# Patient Record
Sex: FEMALE | Race: WHITE | NOT HISPANIC OR LATINO | Employment: OTHER | URBAN - NONMETROPOLITAN AREA
[De-identification: names, ages, dates, MRNs, and addresses within clinical notes are randomized per-mention and may not be internally consistent; named-entity substitution may affect disease eponyms.]

---

## 2022-02-26 ENCOUNTER — HOSPITAL ENCOUNTER (EMERGENCY)
Facility: HOSPITAL | Age: 67
Discharge: HOME | End: 2022-02-26
Attending: EMERGENCY MEDICINE | Admitting: EMERGENCY MEDICINE

## 2022-02-26 ENCOUNTER — APPOINTMENT (OUTPATIENT)
Dept: GENERAL RADIOLOGY | Facility: HOSPITAL | Age: 67
End: 2022-02-26

## 2022-02-26 VITALS
DIASTOLIC BLOOD PRESSURE: 75 MMHG | WEIGHT: 140 LBS | HEART RATE: 96 BPM | OXYGEN SATURATION: 95 % | HEIGHT: 58 IN | TEMPERATURE: 99.7 F | SYSTOLIC BLOOD PRESSURE: 137 MMHG | RESPIRATION RATE: 16 BRPM | BODY MASS INDEX: 29.39 KG/M2

## 2022-02-26 DIAGNOSIS — J44.1 COPD EXACERBATION: Primary | ICD-10-CM

## 2022-02-26 DIAGNOSIS — Z20.822 COVID-19 RULED OUT: ICD-10-CM

## 2022-02-26 LAB
ALBUMIN SERPL-MCNC: 3.7 G/DL (ref 3.5–5.2)
ALBUMIN/GLOB SERPL: 1.2 G/DL
ALP SERPL-CCNC: 135 U/L (ref 39–117)
ALT SERPL W P-5'-P-CCNC: 54 U/L (ref 1–33)
ANION GAP SERPL CALCULATED.3IONS-SCNC: 12.8 MMOL/L (ref 5–15)
AST SERPL-CCNC: 27 U/L (ref 1–32)
BASOPHILS # BLD AUTO: 0.06 10*3/MM3 (ref 0–0.2)
BASOPHILS NFR BLD AUTO: 0.7 % (ref 0–1.5)
BILIRUB SERPL-MCNC: 0.2 MG/DL (ref 0–1.2)
BILIRUB UR QL STRIP: NEGATIVE
BUN SERPL-MCNC: 12 MG/DL (ref 8–23)
BUN/CREAT SERPL: 12.6 (ref 7–25)
CALCIUM SPEC-SCNC: 9.3 MG/DL (ref 8.6–10.5)
CHLORIDE SERPL-SCNC: 96 MMOL/L (ref 98–107)
CLARITY UR: ABNORMAL
CO2 SERPL-SCNC: 26.2 MMOL/L (ref 22–29)
COLOR UR: YELLOW
CREAT SERPL-MCNC: 0.95 MG/DL (ref 0.57–1)
DEPRECATED RDW RBC AUTO: 43.8 FL (ref 37–54)
EOSINOPHIL # BLD AUTO: 0.08 10*3/MM3 (ref 0–0.4)
EOSINOPHIL NFR BLD AUTO: 0.9 % (ref 0.3–6.2)
ERYTHROCYTE [DISTWIDTH] IN BLOOD BY AUTOMATED COUNT: 13.4 % (ref 12.3–15.4)
GFR SERPL CREATININE-BSD FRML MDRD: 58 ML/MIN/1.73
GLOBULIN UR ELPH-MCNC: 3.1 GM/DL
GLUCOSE SERPL-MCNC: 105 MG/DL (ref 65–99)
GLUCOSE UR STRIP-MCNC: NEGATIVE MG/DL
HCT VFR BLD AUTO: 42.8 % (ref 34–46.6)
HGB BLD-MCNC: 15.1 G/DL (ref 12–15.9)
HGB UR QL STRIP.AUTO: NEGATIVE
IMM GRANULOCYTES # BLD AUTO: 0.03 10*3/MM3 (ref 0–0.05)
IMM GRANULOCYTES NFR BLD AUTO: 0.3 % (ref 0–0.5)
KETONES UR QL STRIP: ABNORMAL
LEUKOCYTE ESTERASE UR QL STRIP.AUTO: NEGATIVE
LYMPHOCYTES # BLD AUTO: 1.86 10*3/MM3 (ref 0.7–3.1)
LYMPHOCYTES NFR BLD AUTO: 21 % (ref 19.6–45.3)
MCH RBC QN AUTO: 31.5 PG (ref 26.6–33)
MCHC RBC AUTO-ENTMCNC: 35.3 G/DL (ref 31.5–35.7)
MCV RBC AUTO: 89.4 FL (ref 79–97)
MONOCYTES # BLD AUTO: 0.85 10*3/MM3 (ref 0.1–0.9)
MONOCYTES NFR BLD AUTO: 9.6 % (ref 5–12)
NEUTROPHILS NFR BLD AUTO: 5.97 10*3/MM3 (ref 1.7–7)
NEUTROPHILS NFR BLD AUTO: 67.5 % (ref 42.7–76)
NITRITE UR QL STRIP: NEGATIVE
NRBC BLD AUTO-RTO: 0 /100 WBC (ref 0–0.2)
PH UR STRIP.AUTO: 6 [PH] (ref 5–8)
PLATELET # BLD AUTO: 255 10*3/MM3 (ref 140–450)
PMV BLD AUTO: 10 FL (ref 6–12)
POTASSIUM SERPL-SCNC: 3.6 MMOL/L (ref 3.5–5.2)
PROT SERPL-MCNC: 6.8 G/DL (ref 6–8.5)
PROT UR QL STRIP: ABNORMAL
RBC # BLD AUTO: 4.79 10*6/MM3 (ref 3.77–5.28)
SARS-COV-2 RNA PNL SPEC NAA+PROBE: NOT DETECTED
SODIUM SERPL-SCNC: 135 MMOL/L (ref 136–145)
SP GR UR STRIP: 1.02 (ref 1–1.03)
UROBILINOGEN UR QL STRIP: ABNORMAL
WBC NRBC COR # BLD: 8.85 10*3/MM3 (ref 3.4–10.8)

## 2022-02-26 PROCEDURE — 81003 URINALYSIS AUTO W/O SCOPE: CPT | Performed by: PHYSICIAN ASSISTANT

## 2022-02-26 PROCEDURE — 99283 EMERGENCY DEPT VISIT LOW MDM: CPT

## 2022-02-26 PROCEDURE — 96374 THER/PROPH/DIAG INJ IV PUSH: CPT

## 2022-02-26 PROCEDURE — 87635 SARS-COV-2 COVID-19 AMP PRB: CPT | Performed by: PHYSICIAN ASSISTANT

## 2022-02-26 PROCEDURE — 85025 COMPLETE CBC W/AUTO DIFF WBC: CPT | Performed by: PHYSICIAN ASSISTANT

## 2022-02-26 PROCEDURE — 71045 X-RAY EXAM CHEST 1 VIEW: CPT

## 2022-02-26 PROCEDURE — 93005 ELECTROCARDIOGRAM TRACING: CPT | Performed by: PHYSICIAN ASSISTANT

## 2022-02-26 PROCEDURE — 80053 COMPREHEN METABOLIC PANEL: CPT | Performed by: PHYSICIAN ASSISTANT

## 2022-02-26 PROCEDURE — 25010000002 METHYLPREDNISOLONE PER 125 MG: Performed by: PHYSICIAN ASSISTANT

## 2022-02-26 RX ORDER — ATENOLOL 25 MG/1
25 TABLET ORAL 2 TIMES DAILY
COMMUNITY
End: 2022-02-26 | Stop reason: SDUPTHER

## 2022-02-26 RX ORDER — ALBUTEROL SULFATE 90 UG/1
2 AEROSOL, METERED RESPIRATORY (INHALATION) EVERY 4 HOURS PRN
COMMUNITY
End: 2022-02-26 | Stop reason: SDUPTHER

## 2022-02-26 RX ORDER — DIAZEPAM 5 MG/1
5 TABLET ORAL 2 TIMES DAILY PRN
COMMUNITY
End: 2023-03-31 | Stop reason: SDUPTHER

## 2022-02-26 RX ORDER — QUETIAPINE FUMARATE 50 MG/1
50 TABLET, FILM COATED ORAL 2 TIMES DAILY
COMMUNITY
End: 2022-02-26 | Stop reason: SDUPTHER

## 2022-02-26 RX ORDER — ESCITALOPRAM OXALATE 20 MG/1
20 TABLET ORAL DAILY
Qty: 14 TABLET | Refills: 0 | Status: ON HOLD | OUTPATIENT
Start: 2022-02-26 | End: 2022-07-31

## 2022-02-26 RX ORDER — ATENOLOL 25 MG/1
25 TABLET ORAL 2 TIMES DAILY
Qty: 28 TABLET | Refills: 0 | Status: SHIPPED | OUTPATIENT
Start: 2022-02-26 | End: 2023-03-31 | Stop reason: SDUPTHER

## 2022-02-26 RX ORDER — PREDNISONE 20 MG/1
40 TABLET ORAL DAILY
Qty: 10 TABLET | Refills: 0 | Status: SHIPPED | OUTPATIENT
Start: 2022-02-26 | End: 2022-03-03

## 2022-02-26 RX ORDER — HYDROXYZINE HYDROCHLORIDE 25 MG/1
25 TABLET, FILM COATED ORAL NIGHTLY
Qty: 12 TABLET | Refills: 0 | Status: SHIPPED | OUTPATIENT
Start: 2022-02-26 | End: 2022-09-02

## 2022-02-26 RX ORDER — QUETIAPINE FUMARATE 50 MG/1
50 TABLET, FILM COATED ORAL 2 TIMES DAILY
Qty: 28 TABLET | Refills: 0 | Status: SHIPPED | OUTPATIENT
Start: 2022-02-26 | End: 2023-03-20 | Stop reason: HOSPADM

## 2022-02-26 RX ORDER — ALBUTEROL SULFATE 90 UG/1
2 AEROSOL, METERED RESPIRATORY (INHALATION) EVERY 4 HOURS PRN
Qty: 6.7 G | Refills: 0 | Status: SHIPPED | OUTPATIENT
Start: 2022-02-26

## 2022-02-26 RX ORDER — SODIUM CHLORIDE 0.9 % (FLUSH) 0.9 %
10 SYRINGE (ML) INJECTION AS NEEDED
Status: DISCONTINUED | OUTPATIENT
Start: 2022-02-26 | End: 2022-02-26 | Stop reason: HOSPADM

## 2022-02-26 RX ORDER — ESCITALOPRAM OXALATE 20 MG/1
20 TABLET ORAL DAILY
COMMUNITY
End: 2022-02-26 | Stop reason: SDUPTHER

## 2022-02-26 RX ORDER — METHYLPREDNISOLONE SODIUM SUCCINATE 125 MG/2ML
125 INJECTION, POWDER, LYOPHILIZED, FOR SOLUTION INTRAMUSCULAR; INTRAVENOUS ONCE
Status: COMPLETED | OUTPATIENT
Start: 2022-02-26 | End: 2022-02-26

## 2022-02-26 RX ADMIN — METHYLPREDNISOLONE SODIUM SUCCINATE 125 MG: 125 INJECTION, POWDER, FOR SOLUTION INTRAMUSCULAR; INTRAVENOUS at 17:57

## 2022-02-28 ENCOUNTER — HOSPITAL ENCOUNTER (EMERGENCY)
Facility: HOSPITAL | Age: 67
Discharge: HOME | End: 2022-03-01
Attending: EMERGENCY MEDICINE | Admitting: EMERGENCY MEDICINE

## 2022-02-28 DIAGNOSIS — G47.00 INSOMNIA, UNSPECIFIED TYPE: Primary | ICD-10-CM

## 2022-02-28 DIAGNOSIS — F30.9 MANIA: ICD-10-CM

## 2022-02-28 PROCEDURE — 25010000002 LORAZEPAM PER 2 MG: Performed by: EMERGENCY MEDICINE

## 2022-02-28 PROCEDURE — 96372 THER/PROPH/DIAG INJ SC/IM: CPT

## 2022-02-28 PROCEDURE — 93005 ELECTROCARDIOGRAM TRACING: CPT | Performed by: EMERGENCY MEDICINE

## 2022-02-28 PROCEDURE — 99283 EMERGENCY DEPT VISIT LOW MDM: CPT

## 2022-02-28 RX ORDER — HYDROXYZINE HYDROCHLORIDE 25 MG/1
50 TABLET, FILM COATED ORAL EVERY 6 HOURS PRN
Qty: 30 TABLET | Refills: 0 | Status: SHIPPED | OUTPATIENT
Start: 2022-02-28 | End: 2023-03-31 | Stop reason: SDUPTHER

## 2022-02-28 RX ORDER — LORAZEPAM 2 MG/ML
2 INJECTION INTRAMUSCULAR ONCE
Status: COMPLETED | OUTPATIENT
Start: 2022-02-28 | End: 2022-02-28

## 2022-02-28 RX ADMIN — LORAZEPAM 2 MG: 2 INJECTION INTRAMUSCULAR at 23:57

## 2022-03-01 VITALS
TEMPERATURE: 98.6 F | OXYGEN SATURATION: 93 % | WEIGHT: 140 LBS | RESPIRATION RATE: 18 BRPM | BODY MASS INDEX: 27.48 KG/M2 | HEART RATE: 68 BPM | HEIGHT: 60 IN | SYSTOLIC BLOOD PRESSURE: 154 MMHG | DIASTOLIC BLOOD PRESSURE: 78 MMHG

## 2022-03-01 NOTE — ED PROVIDER NOTES
Subjective   67-year-old female presents to the ED via EMS for chief complaint of insomnia.  Patient is brought in with her son as well.  He indicates that his mother has not slept well for 4 days.  He states that she does have a history of bipolar disorder and manic depressive episodes.  He states that he believes she may be manic currently as she has not slept for 4 days and she has been very hyperactive.  He states that when she does not sleep and get slightly manic she becomes agitated easier and has some mild confusion.  On arrival to the ED the patient is awake alert and oriented x4.  She denies homicidal or suicidal ideations.  Patient was seen and evaluated here recently and placed on steroids for COPD exacerbation.  This may have worsened and/or triggered her hyperactivity/carlotta.  No chest pain or shortness of breath.  No cough or wheeze.  No nausea vomiting diarrhea abdominal pain.  No lightheadedness or dizziness.  No headache.  No other complaints this time.          Review of Systems   Psychiatric/Behavioral: Positive for confusion and sleep disturbance. Negative for self-injury and suicidal ideas. The patient is hyperactive.    All other systems reviewed and are negative.      Past Medical History:   Diagnosis Date   • Anxiety    • COPD (chronic obstructive pulmonary disease) (HCC)    • Coronary artery disease    • Hypotension    • Tachycardia        No Known Allergies    Past Surgical History:   Procedure Laterality Date   • TOTAL HIP ARTHROPLASTY         History reviewed. No pertinent family history.    Social History     Socioeconomic History   • Marital status:    Tobacco Use   • Smoking status: Current Every Day Smoker     Packs/day: 1.00     Types: Cigarettes   • Smokeless tobacco: Never Used   Substance and Sexual Activity   • Alcohol use: Never   • Drug use: Never           Objective   Physical Exam  Vitals and nursing note reviewed.   Constitutional:       General: She is not in acute  distress.     Appearance: She is well-developed. She is not diaphoretic.   HENT:      Head: Normocephalic and atraumatic.      Nose: Nose normal.   Eyes:      Conjunctiva/sclera: Conjunctivae normal.   Cardiovascular:      Rate and Rhythm: Normal rate and regular rhythm.   Pulmonary:      Effort: Pulmonary effort is normal. No respiratory distress.      Breath sounds: Normal breath sounds.   Abdominal:      General: There is no distension.      Palpations: Abdomen is soft.      Tenderness: There is no abdominal tenderness. There is no guarding.   Musculoskeletal:         General: No deformity.   Neurological:      Mental Status: She is alert and oriented to person, place, and time.      Cranial Nerves: No cranial nerve deficit.         Procedures           ED Course  ED Course as of 02/28/22 2346   Mon Feb 28, 2022   2209 EKG interpreted by me.  Sinus rhythm.  Rate of 76.  Low voltage in chest leads.  No ST segment or T wave changes.  Normal EKG [CG]      ED Course User Index  [CG] Robin Saavedra, DO                                                 MDM  67-year-old female presents to the ED with her son complaining of insomnia related to carlotta and her bipolar disorder versus steroid-induced insomnia versus multifactorial.  Hemodynamically stable.  Afebrile.  Resting comfortably.  Suspect her agitation and hyperactivity are worse since she has not slept in 4 days.  Recent work-up 2 days ago that was negative for acute process.  She states that her breathing is doing much better.  Given a dose of Ativan here in the ED.  Will discharge with Atarax and follow-up with her primary care physician and therapist.  Patient and son are both agreeable to this plan and happy with the plan.      Final diagnoses:   Insomnia, unspecified type   Carlotta (HCC)       ED Disposition  ED Disposition     ED Disposition Condition Comment    Discharge Stable           Jesse Reeves, DO  [address]  [facility] [address]  [facility]  [address]  [phone]               Medication List      Changed    * hydrOXYzine 25 MG tablet  Commonly known as: ATARAX  Take 1 tablet by mouth Every Night.  What changed: Another medication with the same name was added. Make sure you understand how and when to take each.     * hydrOXYzine 25 MG tablet  Commonly known as: ATARAX  Take 2 tablets by mouth Every 6 (Six) Hours As Needed for Itching.  What changed: You were already taking a medication with the same name, and this prescription was added. Make sure you understand how and when to take each.         * This list has 2 medication(s) that are the same as other medications prescribed for you. Read the directions carefully, and ask your doctor or other care provider to review them with you.               Where to Get Your Medications      These medications were sent to [facility] - [place], [place] - [address] - [phone]  - [phone]  [address] [place] [address] [address]    Phone: [phone]   · hydrOXYzine 25 MG tablet          Robin Saavedra, DO  02/28/22 4041

## 2022-04-21 ENCOUNTER — APPOINTMENT (OUTPATIENT)
Dept: GENERAL RADIOLOGY | Facility: HOSPITAL | Age: 67
End: 2022-04-21

## 2022-04-21 ENCOUNTER — HOSPITAL ENCOUNTER (EMERGENCY)
Facility: HOSPITAL | Age: 67
Discharge: LEFT BEFORE DISCHARGE | End: 2022-04-21
Attending: EMERGENCY MEDICINE | Admitting: EMERGENCY MEDICINE

## 2022-04-21 VITALS
SYSTOLIC BLOOD PRESSURE: 132 MMHG | HEART RATE: 96 BPM | OXYGEN SATURATION: 95 % | TEMPERATURE: 98.1 F | HEIGHT: 60 IN | BODY MASS INDEX: 24.26 KG/M2 | WEIGHT: 123.6 LBS | RESPIRATION RATE: 16 BRPM | DIASTOLIC BLOOD PRESSURE: 73 MMHG

## 2022-04-21 DIAGNOSIS — Z86.59 HISTORY OF BIPOLAR DISORDER: ICD-10-CM

## 2022-04-21 DIAGNOSIS — G47.9 DIFFICULTY SLEEPING: Primary | ICD-10-CM

## 2022-04-21 LAB
ALBUMIN SERPL-MCNC: 3.7 G/DL (ref 3.5–5.2)
ALBUMIN/GLOB SERPL: 1.4 G/DL
ALP SERPL-CCNC: 132 U/L (ref 39–117)
ALT SERPL W P-5'-P-CCNC: 11 U/L (ref 1–33)
AMPHET+METHAMPHET UR QL: NEGATIVE
AMPHETAMINES UR QL: NEGATIVE
ANION GAP SERPL CALCULATED.3IONS-SCNC: 13.4 MMOL/L (ref 5–15)
APAP SERPL-MCNC: <5 MCG/ML (ref 0–30)
AST SERPL-CCNC: 14 U/L (ref 1–32)
BACTERIA UR QL AUTO: ABNORMAL /HPF
BARBITURATES UR QL SCN: NEGATIVE
BASOPHILS # BLD AUTO: 0.03 10*3/MM3 (ref 0–0.2)
BASOPHILS NFR BLD AUTO: 0.4 % (ref 0–1.5)
BENZODIAZ UR QL SCN: NEGATIVE
BILIRUB SERPL-MCNC: 0.3 MG/DL (ref 0–1.2)
BILIRUB UR QL STRIP: NEGATIVE
BUN SERPL-MCNC: 15 MG/DL (ref 8–23)
BUN/CREAT SERPL: 17.6 (ref 7–25)
BUPRENORPHINE SERPL-MCNC: NEGATIVE NG/ML
CALCIUM SPEC-SCNC: 8.9 MG/DL (ref 8.6–10.5)
CANNABINOIDS SERPL QL: NEGATIVE
CHLORIDE SERPL-SCNC: 98 MMOL/L (ref 98–107)
CLARITY UR: CLEAR
CO2 SERPL-SCNC: 24.6 MMOL/L (ref 22–29)
COCAINE UR QL: NEGATIVE
COLOR UR: YELLOW
CREAT SERPL-MCNC: 0.85 MG/DL (ref 0.57–1)
DEPRECATED RDW RBC AUTO: 46.9 FL (ref 37–54)
EGFRCR SERPLBLD CKD-EPI 2021: 74.7 ML/MIN/1.73
EOSINOPHIL # BLD AUTO: 0.08 10*3/MM3 (ref 0–0.4)
EOSINOPHIL NFR BLD AUTO: 1 % (ref 0.3–6.2)
ERYTHROCYTE [DISTWIDTH] IN BLOOD BY AUTOMATED COUNT: 14 % (ref 12.3–15.4)
ETHANOL BLD-MCNC: <10 MG/DL (ref 0–10)
ETHANOL UR QL: <0.01 %
GLOBULIN UR ELPH-MCNC: 2.7 GM/DL
GLUCOSE SERPL-MCNC: 87 MG/DL (ref 65–99)
GLUCOSE UR STRIP-MCNC: NEGATIVE MG/DL
HCT VFR BLD AUTO: 42.4 % (ref 34–46.6)
HGB BLD-MCNC: 14.3 G/DL (ref 12–15.9)
HGB UR QL STRIP.AUTO: NEGATIVE
HYALINE CASTS UR QL AUTO: ABNORMAL /LPF
IMM GRANULOCYTES # BLD AUTO: 0.02 10*3/MM3 (ref 0–0.05)
IMM GRANULOCYTES NFR BLD AUTO: 0.3 % (ref 0–0.5)
KETONES UR QL STRIP: ABNORMAL
LEUKOCYTE ESTERASE UR QL STRIP.AUTO: ABNORMAL
LYMPHOCYTES # BLD AUTO: 2.36 10*3/MM3 (ref 0.7–3.1)
LYMPHOCYTES NFR BLD AUTO: 30.1 % (ref 19.6–45.3)
MCH RBC QN AUTO: 31 PG (ref 26.6–33)
MCHC RBC AUTO-ENTMCNC: 33.7 G/DL (ref 31.5–35.7)
MCV RBC AUTO: 92 FL (ref 79–97)
METHADONE UR QL SCN: NEGATIVE
MONOCYTES # BLD AUTO: 0.54 10*3/MM3 (ref 0.1–0.9)
MONOCYTES NFR BLD AUTO: 6.9 % (ref 5–12)
MUCOUS THREADS URNS QL MICRO: ABNORMAL /HPF
NEUTROPHILS NFR BLD AUTO: 4.8 10*3/MM3 (ref 1.7–7)
NEUTROPHILS NFR BLD AUTO: 61.3 % (ref 42.7–76)
NITRITE UR QL STRIP: NEGATIVE
NRBC BLD AUTO-RTO: 0 /100 WBC (ref 0–0.2)
OPIATES UR QL: NEGATIVE
OXYCODONE UR QL SCN: NEGATIVE
PCP UR QL SCN: NEGATIVE
PH UR STRIP.AUTO: 5.5 [PH] (ref 5–8)
PLATELET # BLD AUTO: 252 10*3/MM3 (ref 140–450)
PMV BLD AUTO: 10.1 FL (ref 6–12)
POTASSIUM SERPL-SCNC: 3.2 MMOL/L (ref 3.5–5.2)
PROPOXYPH UR QL: NEGATIVE
PROT SERPL-MCNC: 6.4 G/DL (ref 6–8.5)
PROT UR QL STRIP: ABNORMAL
RBC # BLD AUTO: 4.61 10*6/MM3 (ref 3.77–5.28)
RBC # UR STRIP: ABNORMAL /HPF
REF LAB TEST METHOD: ABNORMAL
SALICYLATES SERPL-MCNC: <0.3 MG/DL
SODIUM SERPL-SCNC: 136 MMOL/L (ref 136–145)
SP GR UR STRIP: 1.02 (ref 1–1.03)
SQUAMOUS #/AREA URNS HPF: ABNORMAL /HPF
TRICYCLICS UR QL SCN: POSITIVE
TROPONIN T SERPL-MCNC: <0.01 NG/ML (ref 0–0.03)
UROBILINOGEN UR QL STRIP: ABNORMAL
WBC # UR STRIP: ABNORMAL /HPF
WBC NRBC COR # BLD: 7.83 10*3/MM3 (ref 3.4–10.8)

## 2022-04-21 PROCEDURE — 80053 COMPREHEN METABOLIC PANEL: CPT | Performed by: PHYSICIAN ASSISTANT

## 2022-04-21 PROCEDURE — 84484 ASSAY OF TROPONIN QUANT: CPT | Performed by: PHYSICIAN ASSISTANT

## 2022-04-21 PROCEDURE — 82077 ASSAY SPEC XCP UR&BREATH IA: CPT | Performed by: PHYSICIAN ASSISTANT

## 2022-04-21 PROCEDURE — 36415 COLL VENOUS BLD VENIPUNCTURE: CPT

## 2022-04-21 PROCEDURE — 80179 DRUG ASSAY SALICYLATE: CPT | Performed by: PHYSICIAN ASSISTANT

## 2022-04-21 PROCEDURE — 99282 EMERGENCY DEPT VISIT SF MDM: CPT

## 2022-04-21 PROCEDURE — 85025 COMPLETE CBC W/AUTO DIFF WBC: CPT | Performed by: PHYSICIAN ASSISTANT

## 2022-04-21 PROCEDURE — 81001 URINALYSIS AUTO W/SCOPE: CPT | Performed by: PHYSICIAN ASSISTANT

## 2022-04-21 PROCEDURE — 71045 X-RAY EXAM CHEST 1 VIEW: CPT

## 2022-04-21 PROCEDURE — 93005 ELECTROCARDIOGRAM TRACING: CPT | Performed by: PHYSICIAN ASSISTANT

## 2022-04-21 PROCEDURE — 80306 DRUG TEST PRSMV INSTRMNT: CPT | Performed by: PHYSICIAN ASSISTANT

## 2022-04-21 PROCEDURE — 80143 DRUG ASSAY ACETAMINOPHEN: CPT | Performed by: PHYSICIAN ASSISTANT

## 2022-04-21 RX ORDER — LORAZEPAM 2 MG/ML
1 INJECTION INTRAMUSCULAR ONCE
Status: DISCONTINUED | OUTPATIENT
Start: 2022-04-21 | End: 2022-04-21

## 2022-04-21 RX ORDER — LORAZEPAM 2 MG/ML
2 INJECTION INTRAMUSCULAR ONCE
Status: DISCONTINUED | OUTPATIENT
Start: 2022-04-21 | End: 2022-04-21 | Stop reason: HOSPADM

## 2022-04-21 RX ORDER — SODIUM CHLORIDE 0.9 % (FLUSH) 0.9 %
10 SYRINGE (ML) INJECTION AS NEEDED
Status: DISCONTINUED | OUTPATIENT
Start: 2022-04-21 | End: 2022-04-21 | Stop reason: HOSPADM

## 2022-06-29 ENCOUNTER — TRANSCRIBE ORDERS (OUTPATIENT)
Dept: ADMINISTRATIVE | Facility: HOSPITAL | Age: 67
End: 2022-06-29

## 2022-06-29 DIAGNOSIS — D38.1 NEOPLASM OF UNCERTAIN BEHAVIOR OF TRACHEA, BRONCHUS AND LUNG: Primary | ICD-10-CM

## 2022-07-30 ENCOUNTER — HOSPITAL ENCOUNTER (OUTPATIENT)
Facility: HOSPITAL | Age: 67
Setting detail: OBSERVATION
Discharge: HOME | End: 2022-08-01
Attending: EMERGENCY MEDICINE | Admitting: FAMILY MEDICINE

## 2022-07-30 ENCOUNTER — HOSPITAL ENCOUNTER (OUTPATIENT)
Dept: CT IMAGING | Facility: HOSPITAL | Age: 67
Discharge: HOME | End: 2022-07-30

## 2022-07-30 DIAGNOSIS — R91.8 MASS OF LOWER LOBE OF LEFT LUNG: Primary | ICD-10-CM

## 2022-07-30 DIAGNOSIS — J44.1 COPD WITH ACUTE EXACERBATION: ICD-10-CM

## 2022-07-30 DIAGNOSIS — D38.1 NEOPLASM OF UNCERTAIN BEHAVIOR OF TRACHEA, BRONCHUS AND LUNG: ICD-10-CM

## 2022-07-30 DIAGNOSIS — F41.9 ANXIETY: ICD-10-CM

## 2022-07-30 DIAGNOSIS — Z86.79 HISTORY OF CORONARY ARTERY DISEASE: ICD-10-CM

## 2022-07-30 DIAGNOSIS — R06.2 WHEEZING: ICD-10-CM

## 2022-07-30 DIAGNOSIS — R59.0 MEDIASTINAL LYMPHADENOPATHY: ICD-10-CM

## 2022-07-30 DIAGNOSIS — F17.200 TOBACCO DEPENDENCE: ICD-10-CM

## 2022-07-30 DIAGNOSIS — Z86.59 HISTORY OF BIPOLAR DISORDER: ICD-10-CM

## 2022-07-30 LAB
ALBUMIN SERPL-MCNC: 3.9 G/DL (ref 3.5–5.2)
ALBUMIN/GLOB SERPL: 1.4 G/DL
ALP SERPL-CCNC: 109 U/L (ref 39–117)
ALT SERPL W P-5'-P-CCNC: 9 U/L (ref 1–33)
ANION GAP SERPL CALCULATED.3IONS-SCNC: 10 MMOL/L (ref 5–15)
AST SERPL-CCNC: 14 U/L (ref 1–32)
BASOPHILS # BLD AUTO: 0.06 10*3/MM3 (ref 0–0.2)
BASOPHILS NFR BLD AUTO: 0.7 % (ref 0–1.5)
BILIRUB SERPL-MCNC: 0.2 MG/DL (ref 0–1.2)
BUN SERPL-MCNC: 9 MG/DL (ref 8–23)
BUN/CREAT SERPL: 11.7 (ref 7–25)
CALCIUM SPEC-SCNC: 9.3 MG/DL (ref 8.6–10.5)
CHLORIDE SERPL-SCNC: 101 MMOL/L (ref 98–107)
CO2 SERPL-SCNC: 26 MMOL/L (ref 22–29)
CREAT SERPL-MCNC: 0.77 MG/DL (ref 0.57–1)
D-LACTATE SERPL-SCNC: 1.1 MMOL/L (ref 0.5–2)
DEPRECATED RDW RBC AUTO: 49.2 FL (ref 37–54)
EGFRCR SERPLBLD CKD-EPI 2021: 84.1 ML/MIN/1.73
EOSINOPHIL # BLD AUTO: 0.19 10*3/MM3 (ref 0–0.4)
EOSINOPHIL NFR BLD AUTO: 2.2 % (ref 0.3–6.2)
ERYTHROCYTE [DISTWIDTH] IN BLOOD BY AUTOMATED COUNT: 14.1 % (ref 12.3–15.4)
GLOBULIN UR ELPH-MCNC: 2.8 GM/DL
GLUCOSE SERPL-MCNC: 108 MG/DL (ref 65–99)
HCT VFR BLD AUTO: 40.9 % (ref 34–46.6)
HGB BLD-MCNC: 13.6 G/DL (ref 12–15.9)
HOLD SPECIMEN: NORMAL
HOLD SPECIMEN: NORMAL
IMM GRANULOCYTES # BLD AUTO: 0.02 10*3/MM3 (ref 0–0.05)
IMM GRANULOCYTES NFR BLD AUTO: 0.2 % (ref 0–0.5)
LDH SERPL-CCNC: 214 U/L (ref 135–214)
LYMPHOCYTES # BLD AUTO: 2.18 10*3/MM3 (ref 0.7–3.1)
LYMPHOCYTES NFR BLD AUTO: 25 % (ref 19.6–45.3)
MCH RBC QN AUTO: 31.3 PG (ref 26.6–33)
MCHC RBC AUTO-ENTMCNC: 33.3 G/DL (ref 31.5–35.7)
MCV RBC AUTO: 94 FL (ref 79–97)
MONOCYTES # BLD AUTO: 0.71 10*3/MM3 (ref 0.1–0.9)
MONOCYTES NFR BLD AUTO: 8.1 % (ref 5–12)
NEUTROPHILS NFR BLD AUTO: 5.56 10*3/MM3 (ref 1.7–7)
NEUTROPHILS NFR BLD AUTO: 63.8 % (ref 42.7–76)
NRBC BLD AUTO-RTO: 0 /100 WBC (ref 0–0.2)
NT-PROBNP SERPL-MCNC: 1547 PG/ML (ref 0–900)
PLATELET # BLD AUTO: 307 10*3/MM3 (ref 140–450)
PMV BLD AUTO: 10 FL (ref 6–12)
POTASSIUM SERPL-SCNC: 4.2 MMOL/L (ref 3.5–5.2)
PROCALCITONIN SERPL-MCNC: 0.05 NG/ML (ref 0–0.25)
PROT SERPL-MCNC: 6.7 G/DL (ref 6–8.5)
RBC # BLD AUTO: 4.35 10*6/MM3 (ref 3.77–5.28)
SODIUM SERPL-SCNC: 137 MMOL/L (ref 136–145)
TROPONIN T SERPL-MCNC: <0.01 NG/ML (ref 0–0.03)
WBC NRBC COR # BLD: 8.72 10*3/MM3 (ref 3.4–10.8)
WHOLE BLOOD HOLD COAG: NORMAL
WHOLE BLOOD HOLD SPECIMEN: NORMAL

## 2022-07-30 PROCEDURE — 83605 ASSAY OF LACTIC ACID: CPT | Performed by: PHYSICIAN ASSISTANT

## 2022-07-30 PROCEDURE — 83615 LACTATE (LD) (LDH) ENZYME: CPT | Performed by: PHYSICIAN ASSISTANT

## 2022-07-30 PROCEDURE — 84484 ASSAY OF TROPONIN QUANT: CPT | Performed by: PHYSICIAN ASSISTANT

## 2022-07-30 PROCEDURE — 83880 ASSAY OF NATRIURETIC PEPTIDE: CPT | Performed by: PHYSICIAN ASSISTANT

## 2022-07-30 PROCEDURE — 85025 COMPLETE CBC W/AUTO DIFF WBC: CPT | Performed by: PHYSICIAN ASSISTANT

## 2022-07-30 PROCEDURE — 99284 EMERGENCY DEPT VISIT MOD MDM: CPT

## 2022-07-30 PROCEDURE — 93005 ELECTROCARDIOGRAM TRACING: CPT | Performed by: PHYSICIAN ASSISTANT

## 2022-07-30 PROCEDURE — 82565 ASSAY OF CREATININE: CPT

## 2022-07-30 PROCEDURE — 81003 URINALYSIS AUTO W/O SCOPE: CPT | Performed by: EMERGENCY MEDICINE

## 2022-07-30 PROCEDURE — 80053 COMPREHEN METABOLIC PANEL: CPT | Performed by: PHYSICIAN ASSISTANT

## 2022-07-30 PROCEDURE — 71260 CT THORAX DX C+: CPT

## 2022-07-30 PROCEDURE — 25010000002 IOPAMIDOL 61 % SOLUTION: Performed by: FAMILY MEDICINE

## 2022-07-30 PROCEDURE — 87040 BLOOD CULTURE FOR BACTERIA: CPT | Performed by: PHYSICIAN ASSISTANT

## 2022-07-30 PROCEDURE — 84145 PROCALCITONIN (PCT): CPT | Performed by: PHYSICIAN ASSISTANT

## 2022-07-30 RX ORDER — DIAZEPAM 5 MG/1
5 TABLET ORAL ONCE
Status: COMPLETED | OUTPATIENT
Start: 2022-07-30 | End: 2022-07-30

## 2022-07-30 RX ORDER — SODIUM CHLORIDE 0.9 % (FLUSH) 0.9 %
10 SYRINGE (ML) INJECTION AS NEEDED
Status: DISCONTINUED | OUTPATIENT
Start: 2022-07-30 | End: 2022-08-01 | Stop reason: HOSPADM

## 2022-07-30 RX ORDER — SODIUM CHLORIDE 0.9 % (FLUSH) 0.9 %
10 SYRINGE (ML) INJECTION AS NEEDED
Status: DISCONTINUED | OUTPATIENT
Start: 2022-07-30 | End: 2022-07-31

## 2022-07-30 RX ORDER — NICOTINE 21 MG/24HR
1 PATCH, TRANSDERMAL 24 HOURS TRANSDERMAL
Status: DISCONTINUED | OUTPATIENT
Start: 2022-07-30 | End: 2022-08-01 | Stop reason: HOSPADM

## 2022-07-30 RX ADMIN — DIAZEPAM 5 MG: 5 TABLET ORAL at 23:47

## 2022-07-30 RX ADMIN — IOPAMIDOL 75 ML: 612 INJECTION, SOLUTION INTRAVENOUS at 15:59

## 2022-07-30 RX ADMIN — DOXYCYCLINE 100 MG: 100 INJECTION, POWDER, LYOPHILIZED, FOR SOLUTION INTRAVENOUS at 23:51

## 2022-07-30 RX ADMIN — Medication 1 PATCH: at 23:47

## 2022-07-31 PROBLEM — R91.1 LUNG NODULE: Status: ACTIVE | Noted: 2022-07-31

## 2022-07-31 PROBLEM — M51.34 DDD (DEGENERATIVE DISC DISEASE), THORACIC: Status: ACTIVE | Noted: 2022-07-31

## 2022-07-31 PROBLEM — J44.9 COPD (CHRONIC OBSTRUCTIVE PULMONARY DISEASE): Status: ACTIVE | Noted: 2022-07-31

## 2022-07-31 PROBLEM — F41.9 ANXIETY DISORDER: Status: ACTIVE | Noted: 2022-07-31

## 2022-07-31 PROBLEM — S22.000A COMPRESSION FRACTURE OF BODY OF THORACIC VERTEBRA: Status: ACTIVE | Noted: 2022-07-31

## 2022-07-31 PROBLEM — I10 HTN (HYPERTENSION): Status: ACTIVE | Noted: 2022-07-31

## 2022-07-31 PROBLEM — R91.8 LUNG MASS: Status: ACTIVE | Noted: 2022-07-31

## 2022-07-31 LAB
ANION GAP SERPL CALCULATED.3IONS-SCNC: 8 MMOL/L (ref 5–15)
BASOPHILS # BLD AUTO: 0.06 10*3/MM3 (ref 0–0.2)
BASOPHILS NFR BLD AUTO: 1 % (ref 0–1.5)
BILIRUB UR QL STRIP: NEGATIVE
BUN SERPL-MCNC: 7 MG/DL (ref 8–23)
BUN/CREAT SERPL: 10.3 (ref 7–25)
CALCIUM SPEC-SCNC: 9 MG/DL (ref 8.6–10.5)
CHLORIDE SERPL-SCNC: 104 MMOL/L (ref 98–107)
CLARITY UR: CLEAR
CO2 SERPL-SCNC: 28 MMOL/L (ref 22–29)
COLOR UR: YELLOW
CREAT SERPL-MCNC: 0.68 MG/DL (ref 0.57–1)
DEPRECATED RDW RBC AUTO: 47.9 FL (ref 37–54)
EGFRCR SERPLBLD CKD-EPI 2021: 95 ML/MIN/1.73
EOSINOPHIL # BLD AUTO: 0.23 10*3/MM3 (ref 0–0.4)
EOSINOPHIL NFR BLD AUTO: 3.9 % (ref 0.3–6.2)
ERYTHROCYTE [DISTWIDTH] IN BLOOD BY AUTOMATED COUNT: 14.1 % (ref 12.3–15.4)
FLUAV SUBTYP SPEC NAA+PROBE: NOT DETECTED
FLUBV RNA ISLT QL NAA+PROBE: NOT DETECTED
GLUCOSE SERPL-MCNC: 106 MG/DL (ref 65–99)
GLUCOSE UR STRIP-MCNC: NEGATIVE MG/DL
HCT VFR BLD AUTO: 38.9 % (ref 34–46.6)
HGB BLD-MCNC: 12.7 G/DL (ref 12–15.9)
HGB UR QL STRIP.AUTO: NEGATIVE
HOLD SPECIMEN: NORMAL
IMM GRANULOCYTES # BLD AUTO: 0.01 10*3/MM3 (ref 0–0.05)
IMM GRANULOCYTES NFR BLD AUTO: 0.2 % (ref 0–0.5)
KETONES UR QL STRIP: NEGATIVE
LEUKOCYTE ESTERASE UR QL STRIP.AUTO: NEGATIVE
LYMPHOCYTES # BLD AUTO: 2.1 10*3/MM3 (ref 0.7–3.1)
LYMPHOCYTES NFR BLD AUTO: 35.5 % (ref 19.6–45.3)
MCH RBC QN AUTO: 30.3 PG (ref 26.6–33)
MCHC RBC AUTO-ENTMCNC: 32.6 G/DL (ref 31.5–35.7)
MCV RBC AUTO: 92.8 FL (ref 79–97)
MONOCYTES # BLD AUTO: 0.56 10*3/MM3 (ref 0.1–0.9)
MONOCYTES NFR BLD AUTO: 9.5 % (ref 5–12)
NEUTROPHILS NFR BLD AUTO: 2.95 10*3/MM3 (ref 1.7–7)
NEUTROPHILS NFR BLD AUTO: 49.9 % (ref 42.7–76)
NITRITE UR QL STRIP: NEGATIVE
NRBC BLD AUTO-RTO: 0 /100 WBC (ref 0–0.2)
PH UR STRIP.AUTO: 7 [PH] (ref 5–8)
PLATELET # BLD AUTO: 292 10*3/MM3 (ref 140–450)
PMV BLD AUTO: 9.5 FL (ref 6–12)
POTASSIUM SERPL-SCNC: 4.2 MMOL/L (ref 3.5–5.2)
PROT UR QL STRIP: NEGATIVE
QT INTERVAL: 414 MS
QTC INTERVAL: 465 MS
RBC # BLD AUTO: 4.19 10*6/MM3 (ref 3.77–5.28)
SARS-COV-2 RNA PNL SPEC NAA+PROBE: NOT DETECTED
SODIUM SERPL-SCNC: 140 MMOL/L (ref 136–145)
SP GR UR STRIP: 1.03 (ref 1–1.03)
UROBILINOGEN UR QL STRIP: NORMAL
WBC NRBC COR # BLD: 5.91 10*3/MM3 (ref 3.4–10.8)

## 2022-07-31 PROCEDURE — 99220 PR INITIAL OBSERVATION CARE/DAY 70 MINUTES: CPT | Performed by: FAMILY MEDICINE

## 2022-07-31 PROCEDURE — 96375 TX/PRO/DX INJ NEW DRUG ADDON: CPT

## 2022-07-31 PROCEDURE — 99204 OFFICE O/P NEW MOD 45 MIN: CPT | Performed by: INTERNAL MEDICINE

## 2022-07-31 PROCEDURE — 96376 TX/PRO/DX INJ SAME DRUG ADON: CPT

## 2022-07-31 PROCEDURE — G0378 HOSPITAL OBSERVATION PER HR: HCPCS

## 2022-07-31 PROCEDURE — 80048 BASIC METABOLIC PNL TOTAL CA: CPT

## 2022-07-31 PROCEDURE — 85025 COMPLETE CBC W/AUTO DIFF WBC: CPT

## 2022-07-31 PROCEDURE — 25010000002 PIPERACILLIN SOD-TAZOBACTAM PER 1 G: Performed by: INTERNAL MEDICINE

## 2022-07-31 PROCEDURE — 25010000002 METHYLPREDNISOLONE PER 40 MG: Performed by: FAMILY MEDICINE

## 2022-07-31 PROCEDURE — 94799 UNLISTED PULMONARY SVC/PX: CPT

## 2022-07-31 PROCEDURE — 87636 SARSCOV2 & INF A&B AMP PRB: CPT | Performed by: PHYSICIAN ASSISTANT

## 2022-07-31 PROCEDURE — 25010000002 METHYLPREDNISOLONE PER 125 MG: Performed by: FAMILY MEDICINE

## 2022-07-31 PROCEDURE — 94640 AIRWAY INHALATION TREATMENT: CPT

## 2022-07-31 RX ORDER — ATENOLOL 25 MG/1
25 TABLET ORAL ONCE
Status: COMPLETED | OUTPATIENT
Start: 2022-07-31 | End: 2022-07-31

## 2022-07-31 RX ORDER — METHYLPREDNISOLONE SODIUM SUCCINATE 40 MG/ML
40 INJECTION, POWDER, LYOPHILIZED, FOR SOLUTION INTRAMUSCULAR; INTRAVENOUS EVERY 24 HOURS
Status: DISCONTINUED | OUTPATIENT
Start: 2022-08-01 | End: 2022-08-01 | Stop reason: HOSPADM

## 2022-07-31 RX ORDER — ACETAMINOPHEN 325 MG/1
650 TABLET ORAL EVERY 6 HOURS PRN
Status: DISCONTINUED | OUTPATIENT
Start: 2022-07-31 | End: 2022-08-01 | Stop reason: HOSPADM

## 2022-07-31 RX ORDER — METHYLPREDNISOLONE SODIUM SUCCINATE 40 MG/ML
40 INJECTION, POWDER, LYOPHILIZED, FOR SOLUTION INTRAMUSCULAR; INTRAVENOUS EVERY 8 HOURS
Status: DISCONTINUED | OUTPATIENT
Start: 2022-07-31 | End: 2022-07-31

## 2022-07-31 RX ORDER — LANOLIN ALCOHOL/MO/W.PET/CERES
1000 CREAM (GRAM) TOPICAL DAILY
Status: DISCONTINUED | OUTPATIENT
Start: 2022-07-31 | End: 2022-08-01 | Stop reason: HOSPADM

## 2022-07-31 RX ORDER — MELATONIN
1000 DAILY
COMMUNITY

## 2022-07-31 RX ORDER — ACETAMINOPHEN 500 MG
1000 TABLET ORAL EVERY 4 HOURS PRN
Status: DISCONTINUED | OUTPATIENT
Start: 2022-07-31 | End: 2022-08-01 | Stop reason: HOSPADM

## 2022-07-31 RX ORDER — ACETAMINOPHEN 160 MG/5ML
650 SOLUTION ORAL EVERY 4 HOURS PRN
Status: DISCONTINUED | OUTPATIENT
Start: 2022-07-31 | End: 2022-08-01 | Stop reason: HOSPADM

## 2022-07-31 RX ORDER — ACETAMINOPHEN 500 MG
1000 TABLET ORAL EVERY 4 HOURS PRN
COMMUNITY

## 2022-07-31 RX ORDER — DIAZEPAM 5 MG/1
5 TABLET ORAL EVERY 12 HOURS PRN
Status: DISCONTINUED | OUTPATIENT
Start: 2022-07-31 | End: 2022-08-01 | Stop reason: HOSPADM

## 2022-07-31 RX ORDER — ALBUTEROL SULFATE 2.5 MG/3ML
2.5 SOLUTION RESPIRATORY (INHALATION) EVERY 4 HOURS PRN
Status: DISCONTINUED | OUTPATIENT
Start: 2022-07-31 | End: 2022-08-01 | Stop reason: HOSPADM

## 2022-07-31 RX ORDER — ATENOLOL 25 MG/1
25 TABLET ORAL 2 TIMES DAILY
Status: DISCONTINUED | OUTPATIENT
Start: 2022-07-31 | End: 2022-08-01 | Stop reason: HOSPADM

## 2022-07-31 RX ORDER — QUETIAPINE FUMARATE 25 MG/1
25 TABLET, FILM COATED ORAL 2 TIMES DAILY
Status: DISCONTINUED | OUTPATIENT
Start: 2022-07-31 | End: 2022-08-01 | Stop reason: HOSPADM

## 2022-07-31 RX ORDER — LANOLIN ALCOHOL/MO/W.PET/CERES
1000 CREAM (GRAM) TOPICAL DAILY
COMMUNITY

## 2022-07-31 RX ORDER — ACETAMINOPHEN 325 MG/1
650 TABLET ORAL EVERY 4 HOURS PRN
Status: DISCONTINUED | OUTPATIENT
Start: 2022-07-31 | End: 2022-08-01 | Stop reason: HOSPADM

## 2022-07-31 RX ORDER — MELATONIN
1000 DAILY
Status: DISCONTINUED | OUTPATIENT
Start: 2022-07-31 | End: 2022-08-01 | Stop reason: HOSPADM

## 2022-07-31 RX ORDER — HYDROXYZINE HYDROCHLORIDE 25 MG/1
25 TABLET, FILM COATED ORAL NIGHTLY
Status: DISCONTINUED | OUTPATIENT
Start: 2022-07-31 | End: 2022-08-01 | Stop reason: HOSPADM

## 2022-07-31 RX ORDER — FUROSEMIDE 10 MG/ML
40 INJECTION INTRAMUSCULAR; INTRAVENOUS ONCE
Status: COMPLETED | OUTPATIENT
Start: 2022-08-01 | End: 2022-08-01

## 2022-07-31 RX ORDER — ACETAMINOPHEN 650 MG/1
650 SUPPOSITORY RECTAL EVERY 4 HOURS PRN
Status: DISCONTINUED | OUTPATIENT
Start: 2022-07-31 | End: 2022-08-01 | Stop reason: HOSPADM

## 2022-07-31 RX ORDER — PANTOPRAZOLE SODIUM 40 MG/1
40 TABLET, DELAYED RELEASE ORAL DAILY
COMMUNITY
End: 2023-03-31 | Stop reason: SDUPTHER

## 2022-07-31 RX ORDER — QUETIAPINE FUMARATE 25 MG/1
50 TABLET, FILM COATED ORAL 2 TIMES DAILY
Status: DISCONTINUED | OUTPATIENT
Start: 2022-07-31 | End: 2022-07-31

## 2022-07-31 RX ORDER — METHYLPREDNISOLONE SODIUM SUCCINATE 125 MG/2ML
80 INJECTION, POWDER, LYOPHILIZED, FOR SOLUTION INTRAMUSCULAR; INTRAVENOUS ONCE
Status: COMPLETED | OUTPATIENT
Start: 2022-07-31 | End: 2022-07-31

## 2022-07-31 RX ORDER — BUDESONIDE AND FORMOTEROL FUMARATE DIHYDRATE 160; 4.5 UG/1; UG/1
2 AEROSOL RESPIRATORY (INHALATION)
Status: DISCONTINUED | OUTPATIENT
Start: 2022-07-31 | End: 2022-08-01 | Stop reason: HOSPADM

## 2022-07-31 RX ORDER — IPRATROPIUM BROMIDE AND ALBUTEROL SULFATE 2.5; .5 MG/3ML; MG/3ML
3 SOLUTION RESPIRATORY (INHALATION)
Status: DISCONTINUED | OUTPATIENT
Start: 2022-07-31 | End: 2022-08-01 | Stop reason: HOSPADM

## 2022-07-31 RX ORDER — PANTOPRAZOLE SODIUM 40 MG/1
40 TABLET, DELAYED RELEASE ORAL DAILY
Status: DISCONTINUED | OUTPATIENT
Start: 2022-07-31 | End: 2022-08-01 | Stop reason: HOSPADM

## 2022-07-31 RX ORDER — SODIUM CHLORIDE 0.9 % (FLUSH) 0.9 %
10 SYRINGE (ML) INJECTION EVERY 12 HOURS SCHEDULED
Status: DISCONTINUED | OUTPATIENT
Start: 2022-07-31 | End: 2022-08-01 | Stop reason: HOSPADM

## 2022-07-31 RX ORDER — ESCITALOPRAM OXALATE 20 MG/1
20 TABLET ORAL DAILY
Status: DISCONTINUED | OUTPATIENT
Start: 2022-07-31 | End: 2022-07-31

## 2022-07-31 RX ADMIN — METHYLPREDNISOLONE SODIUM SUCCINATE 80 MG: 125 INJECTION, POWDER, FOR SOLUTION INTRAMUSCULAR; INTRAVENOUS at 05:56

## 2022-07-31 RX ADMIN — BUDESONIDE AND FORMOTEROL FUMARATE DIHYDRATE 2 PUFF: 160; 4.5 AEROSOL RESPIRATORY (INHALATION) at 19:43

## 2022-07-31 RX ADMIN — ATENOLOL 25 MG: 25 TABLET ORAL at 20:53

## 2022-07-31 RX ADMIN — QUETIAPINE FUMARATE 25 MG: 25 TABLET ORAL at 08:44

## 2022-07-31 RX ADMIN — DIAZEPAM 5 MG: 5 TABLET ORAL at 18:30

## 2022-07-31 RX ADMIN — PANTOPRAZOLE SODIUM 40 MG: 40 TABLET, DELAYED RELEASE ORAL at 08:44

## 2022-07-31 RX ADMIN — TAZOBACTAM SODIUM AND PIPERACILLIN SODIUM 3.38 G: 375; 3 INJECTION, SOLUTION INTRAVENOUS at 16:17

## 2022-07-31 RX ADMIN — METHYLPREDNISOLONE SODIUM SUCCINATE 40 MG: 40 INJECTION, POWDER, FOR SOLUTION INTRAMUSCULAR; INTRAVENOUS at 14:13

## 2022-07-31 RX ADMIN — IPRATROPIUM BROMIDE AND ALBUTEROL SULFATE 3 ML: .5; 3 SOLUTION RESPIRATORY (INHALATION) at 19:42

## 2022-07-31 RX ADMIN — IPRATROPIUM BROMIDE AND ALBUTEROL SULFATE 3 ML: .5; 3 SOLUTION RESPIRATORY (INHALATION) at 16:55

## 2022-07-31 RX ADMIN — BUDESONIDE AND FORMOTEROL FUMARATE DIHYDRATE 2 PUFF: 160; 4.5 AEROSOL RESPIRATORY (INHALATION) at 06:07

## 2022-07-31 RX ADMIN — ATENOLOL 25 MG: 25 TABLET ORAL at 08:48

## 2022-07-31 RX ADMIN — HYDROXYZINE HYDROCHLORIDE 25 MG: 25 TABLET ORAL at 20:54

## 2022-07-31 RX ADMIN — Medication 10 ML: at 08:44

## 2022-07-31 RX ADMIN — Medication 10 ML: at 05:57

## 2022-07-31 RX ADMIN — TAZOBACTAM SODIUM AND PIPERACILLIN SODIUM 3.38 G: 375; 3 INJECTION, SOLUTION INTRAVENOUS at 20:55

## 2022-07-31 RX ADMIN — ATENOLOL 25 MG: 25 TABLET ORAL at 05:56

## 2022-07-31 RX ADMIN — Medication 1 PATCH: at 20:56

## 2022-07-31 RX ADMIN — IPRATROPIUM BROMIDE AND ALBUTEROL SULFATE 3 ML: .5; 3 SOLUTION RESPIRATORY (INHALATION) at 06:00

## 2022-07-31 RX ADMIN — QUETIAPINE FUMARATE 25 MG: 25 TABLET ORAL at 20:54

## 2022-07-31 RX ADMIN — Medication 1000 UNITS: at 08:43

## 2022-07-31 RX ADMIN — ACETAMINOPHEN 650 MG: 325 TABLET ORAL at 04:27

## 2022-07-31 RX ADMIN — CYANOCOBALAMIN TAB 1000 MCG 1000 MCG: 1000 TAB at 08:44

## 2022-07-31 RX ADMIN — Medication 10 ML: at 20:53

## 2022-08-01 VITALS
HEIGHT: 60 IN | WEIGHT: 130.9 LBS | OXYGEN SATURATION: 96 % | RESPIRATION RATE: 19 BRPM | BODY MASS INDEX: 25.7 KG/M2 | TEMPERATURE: 98.5 F | SYSTOLIC BLOOD PRESSURE: 156 MMHG | HEART RATE: 76 BPM | DIASTOLIC BLOOD PRESSURE: 76 MMHG

## 2022-08-01 DIAGNOSIS — R91.1 LUNG NODULE: Primary | ICD-10-CM

## 2022-08-01 LAB
ALBUMIN SERPL-MCNC: 4.5 G/DL (ref 3.5–5.2)
ALBUMIN/GLOB SERPL: 1.7 G/DL
ALP SERPL-CCNC: 115 U/L (ref 39–117)
ALT SERPL W P-5'-P-CCNC: 16 U/L (ref 1–33)
ANION GAP SERPL CALCULATED.3IONS-SCNC: 13 MMOL/L (ref 5–15)
AST SERPL-CCNC: 21 U/L (ref 1–32)
BILIRUB SERPL-MCNC: 0.2 MG/DL (ref 0–1.2)
BUN SERPL-MCNC: 16 MG/DL (ref 8–23)
BUN/CREAT SERPL: 21.3 (ref 7–25)
CALCIUM SPEC-SCNC: 9.8 MG/DL (ref 8.6–10.5)
CHLORIDE SERPL-SCNC: 100 MMOL/L (ref 98–107)
CO2 SERPL-SCNC: 24 MMOL/L (ref 22–29)
CREAT SERPL-MCNC: 0.75 MG/DL (ref 0.57–1)
DEPRECATED RDW RBC AUTO: 46.4 FL (ref 37–54)
EGFRCR SERPLBLD CKD-EPI 2021: 86.8 ML/MIN/1.73
ERYTHROCYTE [DISTWIDTH] IN BLOOD BY AUTOMATED COUNT: 13.7 % (ref 12.3–15.4)
GLOBULIN UR ELPH-MCNC: 2.6 GM/DL
GLUCOSE SERPL-MCNC: 112 MG/DL (ref 65–99)
HCT VFR BLD AUTO: 40.1 % (ref 34–46.6)
HGB BLD-MCNC: 13.7 G/DL (ref 12–15.9)
MCH RBC QN AUTO: 31.2 PG (ref 26.6–33)
MCHC RBC AUTO-ENTMCNC: 34.2 G/DL (ref 31.5–35.7)
MCV RBC AUTO: 91.3 FL (ref 79–97)
PLATELET # BLD AUTO: 314 10*3/MM3 (ref 140–450)
PMV BLD AUTO: 9.8 FL (ref 6–12)
POTASSIUM SERPL-SCNC: 4.4 MMOL/L (ref 3.5–5.2)
PROT SERPL-MCNC: 7.1 G/DL (ref 6–8.5)
RBC # BLD AUTO: 4.39 10*6/MM3 (ref 3.77–5.28)
SODIUM SERPL-SCNC: 137 MMOL/L (ref 136–145)
WBC NRBC COR # BLD: 11.63 10*3/MM3 (ref 3.4–10.8)

## 2022-08-01 PROCEDURE — G0378 HOSPITAL OBSERVATION PER HR: HCPCS

## 2022-08-01 PROCEDURE — 94664 DEMO&/EVAL PT USE INHALER: CPT

## 2022-08-01 PROCEDURE — 96366 THER/PROPH/DIAG IV INF ADDON: CPT

## 2022-08-01 PROCEDURE — 25010000002 FUROSEMIDE PER 20 MG: Performed by: INTERNAL MEDICINE

## 2022-08-01 PROCEDURE — 80053 COMPREHEN METABOLIC PANEL: CPT | Performed by: HOSPITALIST

## 2022-08-01 PROCEDURE — 99217 PR OBSERVATION CARE DISCHARGE MANAGEMENT: CPT | Performed by: FAMILY MEDICINE

## 2022-08-01 PROCEDURE — 94799 UNLISTED PULMONARY SVC/PX: CPT

## 2022-08-01 PROCEDURE — 85027 COMPLETE CBC AUTOMATED: CPT | Performed by: HOSPITALIST

## 2022-08-01 PROCEDURE — 96375 TX/PRO/DX INJ NEW DRUG ADDON: CPT

## 2022-08-01 PROCEDURE — 25010000002 PIPERACILLIN SOD-TAZOBACTAM PER 1 G: Performed by: INTERNAL MEDICINE

## 2022-08-01 PROCEDURE — 94761 N-INVAS EAR/PLS OXIMETRY MLT: CPT

## 2022-08-01 PROCEDURE — 96365 THER/PROPH/DIAG IV INF INIT: CPT

## 2022-08-01 RX ORDER — LEVOFLOXACIN 750 MG/1
750 TABLET ORAL DAILY
Qty: 7 TABLET | Refills: 0 | Status: SHIPPED | OUTPATIENT
Start: 2022-08-01 | End: 2022-08-08

## 2022-08-01 RX ORDER — POTASSIUM CHLORIDE 750 MG/1
10 TABLET, FILM COATED, EXTENDED RELEASE ORAL DAILY
Qty: 30 TABLET | Refills: 0 | Status: SHIPPED | OUTPATIENT
Start: 2022-08-01

## 2022-08-01 RX ORDER — PREDNISONE 20 MG/1
40 TABLET ORAL DAILY
Qty: 10 TABLET | Refills: 0 | Status: SHIPPED | OUTPATIENT
Start: 2022-08-01 | End: 2022-08-06

## 2022-08-01 RX ORDER — CALCIUM CARBONATE 200(500)MG
2 TABLET,CHEWABLE ORAL ONCE
Status: COMPLETED | OUTPATIENT
Start: 2022-08-01 | End: 2022-08-01

## 2022-08-01 RX ORDER — FUROSEMIDE 20 MG/1
20 TABLET ORAL DAILY
Qty: 30 TABLET | Refills: 0 | Status: SHIPPED | OUTPATIENT
Start: 2022-08-01 | End: 2023-03-31 | Stop reason: SDUPTHER

## 2022-08-01 RX ADMIN — ATENOLOL 25 MG: 25 TABLET ORAL at 08:09

## 2022-08-01 RX ADMIN — TAZOBACTAM SODIUM AND PIPERACILLIN SODIUM 3.38 G: 375; 3 INJECTION, SOLUTION INTRAVENOUS at 05:14

## 2022-08-01 RX ADMIN — IPRATROPIUM BROMIDE AND ALBUTEROL SULFATE 3 ML: .5; 3 SOLUTION RESPIRATORY (INHALATION) at 07:34

## 2022-08-01 RX ADMIN — Medication 10 ML: at 08:09

## 2022-08-01 RX ADMIN — QUETIAPINE FUMARATE 25 MG: 25 TABLET ORAL at 08:09

## 2022-08-01 RX ADMIN — BUDESONIDE AND FORMOTEROL FUMARATE DIHYDRATE 2 PUFF: 160; 4.5 AEROSOL RESPIRATORY (INHALATION) at 07:33

## 2022-08-01 RX ADMIN — PANTOPRAZOLE SODIUM 40 MG: 40 TABLET, DELAYED RELEASE ORAL at 08:09

## 2022-08-01 RX ADMIN — FUROSEMIDE 40 MG: 10 INJECTION, SOLUTION INTRAMUSCULAR; INTRAVENOUS at 08:08

## 2022-08-01 RX ADMIN — ACETAMINOPHEN 1000 MG: 500 TABLET ORAL at 02:38

## 2022-08-01 RX ADMIN — Medication 1000 UNITS: at 08:09

## 2022-08-01 RX ADMIN — CYANOCOBALAMIN TAB 1000 MCG 1000 MCG: 1000 TAB at 08:09

## 2022-08-01 RX ADMIN — ANTACID TABLETS 2 TABLET: 500 TABLET, CHEWABLE ORAL at 11:06

## 2022-08-01 NOTE — CASE MANAGEMENT/SOCIAL WORK
Discharge Planning Assessment  [facility]     Patient Name: Emily Grant  MRN: [id number]  Today's Date: 8/1/2022    Admit Date: 7/30/2022     Discharge Needs Assessment     Row Name 08/01/22 1655       Living Environment    People in Home alone    Current Living Arrangements apartment    Primary Care Provided by self    Provides Primary Care For no one    Family Caregiver if Needed child(teresa), adult    Family Caregiver Names Landon Grant    Quality of Family Relationships unable to assess    Able to Return to Prior Arrangements yes    Living Arrangement Comments Pt discharged before CM was able to see pt. Information taken from chart. Pt resides in [facility] in an apartment alone.       Transition Planning    Patient/Family Anticipates Transition to home    Patient/Family Anticipated Services at Transition     Transportation Anticipated family or friend will provide       Discharge Needs Assessment    Readmission Within the Last 30 Days no previous admission in last 30 days    Equipment Currently Used at Home nebulizer    Concerns to be Addressed no discharge needs identified    Equipment Needed After Discharge nebulizer    Discharge Coordination/Progress Pt has [employer] and [employer] and uses [facility] for pharmacy. Pt has history of COPD and HTN and here for  lower lobe noncalcified nodule. Pt discharged home and no discharge needs identified.               Discharge Plan     Row Name 08/01/22 1658       Plan    Plan discharge plan    Plan Comments Pt discharged home before CM was able to see pt. No discharge needs identified.    Final Discharge Disposition Code 01 - home or self-care              Continued Care and Services - Discharged on 8/1/2022 Admission date: 7/30/2022 - Discharge disposition: Home or Self Care   Coordination has not been started for this encounter.       Expected Discharge Date and Time     Expected Discharge Date Expected Discharge Time    Aug 1, 2022           Demographic Summary     Row Name 08/01/22 4756       General Information    General Information Comments Pt's PCP is MOR GAMEZ       Contact Information    Permission Granted to Share Info With     Contact Information Obtained for     Contact Information Comments Landon Grant(son) [phone]               Functional Status    No documentation.                Psychosocial    No documentation.                Abuse/Neglect    No documentation.                Legal    No documentation.                Substance Abuse    No documentation.                Patient Forms    No documentation.                   Lamar Delvalle RN

## 2022-08-01 NOTE — PLAN OF CARE
Goal Outcome Evaluation:  VSS. AxOx4. Pt denies SOB. Room air. Preparing to discharge. Will continue to monitor.     Problem: Adult Inpatient Plan of Care  Goal: Absence of Hospital-Acquired Illness or Injury  Intervention: Identify and Manage Fall Risk  Description: Perform standard risk assessment on admission using a validated tool or comprehensive approach appropriate to the patient; reassess fall risk frequently, with change in status or transfer to another level of care.  Communicate fall injury risk to interprofessional healthcare team.  Determine need for increased observation, equipment and environmental modification, such as low bed, signage and supportive, nonskid footwear.  Adjust safety measures to individual developmental age, stage and identified risk factors.  Reinforce the importance of safety and physical activity with patient and family.  Perform regular intentional rounding to assess need for position change, pain assessment and personal needs, including assistance with toileting.  Recent Flowsheet Documentation  Taken 8/1/2022 0730 by Norma Lawrence, RN  Safety Promotion/Fall Prevention:   activity supervised   assistive device/personal items within reach   clutter free environment maintained   fall prevention program maintained   nonskid shoes/slippers when out of bed   room organization consistent   safety round/check completed  Intervention: Prevent Skin Injury  Description: Perform a screening for skin injury risk, such as pressure or moisture associated skin damage on admission and at regular intervals throughout hospital stay.  Keep all areas of skin (especially folds) clean and dry.  Maintain adequate skin hydration.  Relieve and redistribute pressure and protect bony prominences; implement measures based on patient-specific risk factors.  Match turning and repositioning schedule to clinical condition.  Encourage weight shift frequently; assist with reposition if unable to complete  independently.  Float heels off bed; avoid pressure on the Achilles tendon.  Keep skin free from extended contact with medical devices.  Encourage functional activity and mobility, as early as tolerated.  Use aids (e.g., slide boards, mechanical lift) during transfer.  Recent Flowsheet Documentation  Taken 8/1/2022 0730 by Norma Lawrence RN  Body Position:   position changed independently   sitting up in bed   dangle, side of bed  Skin Protection:   adhesive use limited   incontinence pads utilized   transparent dressing maintained   tubing/devices free from skin contact  Intervention: Prevent and Manage VTE (Venous Thromboembolism) Risk  Description: Assess for VTE (venous thromboembolism) risk.  Encourage and assist with early ambulation.  Initiate and maintain compression or other therapy, as indicated, based on identified risk in accordance with organizational protocol and provider order.  Encourage both active and passive leg exercises while in bed, if unable to ambulate.  Recent Flowsheet Documentation  Taken 8/1/2022 0730 by Norma Lawrence RN  Activity Management: activity adjusted per tolerance  VTE Prevention/Management:   bilateral   dorsiflexion/plantar flexion performed   bleeding risk factor(s) identified  Intervention: Prevent Infection  Description: Maintain skin and mucous membrane integrity; promote hand, oral and pulmonary hygiene.  Optimize fluid balance, nutrition, sleep and glycemic control to maximize infection resistance.  Identify potential sources of infection early to prevent or mitigate progression of infection (e.g., wound, lines, devices).  Evaluate ongoing need for invasive devices; remove promptly when no longer indicated.  Recent Flowsheet Documentation  Taken 8/1/2022 0730 by Norma Lawrence RN  Infection Prevention:   rest/sleep promoted   environmental surveillance performed   cohorting utilized  Goal: Optimal Comfort and Wellbeing  Intervention: Provide  Person-Centered Care  Description: Use a family-focused approach to care.  Develop trust and rapport by proactively providing information, encouraging questions, addressing concerns and offering reassurance.  Acknowledge emotional response to hospitalization.  Recognize and utilize personal coping strategies.  Honor spiritual and cultural preferences.  Recent Flowsheet Documentation  Taken 8/1/2022 6930 by Norma Lawrence RN  Trust Relationship/Rapport:   care explained   choices provided   empathic listening provided   questions answered   thoughts/feelings acknowledged

## 2022-08-01 NOTE — PROGRESS NOTES
Patient can be discharged from pulmonary standpoint.  I would like her to complete 7 days of Levaquin in addition to a 5-day course of prednisone.  I have made her an appointment with me on 9/6/2022 at 11 AM.  I have also placed an order for the patient to have a CT scan of the chest without contrast in roughly 6 weeks prior to that appointment.  I have informed her of this, she was agreeable to the plan.  Pulmonary will sign off.    Electronically signed by Ga Mcmillan DO, 08/01/22, 8:37 AM EDT.

## 2022-08-01 NOTE — PLAN OF CARE
Problem: Adult Inpatient Plan of Care  Goal: Plan of Care Review  Outcome: Ongoing, Progressing  Goal: Patient-Specific Goal (Individualized)  Outcome: Ongoing, Progressing  Goal: Absence of Hospital-Acquired Illness or Injury  Outcome: Ongoing, Progressing  Intervention: Identify and Manage Fall Risk  Recent Flowsheet Documentation  Taken 8/1/2022 0200 by Zhou Macedo RN  Safety Promotion/Fall Prevention:   assistive device/personal items within reach   clutter free environment maintained   fall prevention program maintained   lighting adjusted   nonskid shoes/slippers when out of bed   room organization consistent   safety round/check completed  Taken 8/1/2022 0000 by Zhou Macedo RN  Safety Promotion/Fall Prevention:   assistive device/personal items within reach   clutter free environment maintained   fall prevention program maintained   lighting adjusted   nonskid shoes/slippers when out of bed   room organization consistent   safety round/check completed  Taken 7/31/2022 2200 by Zhou Macedo RN  Safety Promotion/Fall Prevention:   assistive device/personal items within reach   clutter free environment maintained   fall prevention program maintained   lighting adjusted   nonskid shoes/slippers when out of bed   room organization consistent   safety round/check completed  Taken 7/31/2022 2000 by Zhou Macedo RN  Safety Promotion/Fall Prevention:   assistive device/personal items within reach   clutter free environment maintained   fall prevention program maintained   lighting adjusted   nonskid shoes/slippers when out of bed   room organization consistent   safety round/check completed  Intervention: Prevent Skin Injury  Recent Flowsheet Documentation  Taken 8/1/2022 0200 by Zhou Macedo, RN  Skin Protection:   adhesive use limited   incontinence pads utilized   transparent dressing maintained   tubing/devices free from skin contact  Taken 8/1/2022 0000 by Zhou Macedo  RN  Skin Protection:   adhesive use limited   incontinence pads utilized   transparent dressing maintained   tubing/devices free from skin contact  Taken 7/31/2022 2200 by Zhou Macedo RN  Skin Protection:   adhesive use limited   incontinence pads utilized   transparent dressing maintained   tubing/devices free from skin contact  Taken 7/31/2022 2000 by Zhou Macedo RN  Skin Protection:   adhesive use limited   incontinence pads utilized   transparent dressing maintained   tubing/devices free from skin contact  Intervention: Prevent and Manage VTE (Venous Thromboembolism) Risk  Recent Flowsheet Documentation  Taken 8/1/2022 0200 by Zhou Macedo RN  Activity Management:   activity adjusted per tolerance   up ad igor  Taken 8/1/2022 0000 by Zhou Macedo RN  Activity Management:   activity adjusted per tolerance   up ad igor  Taken 7/31/2022 2200 by Zhou Macedo RN  Activity Management:   activity adjusted per tolerance   up ad igor  Taken 7/31/2022 2000 by Zhou Macedo RN  Activity Management:   activity adjusted per tolerance   up ad igor   up in chair  VTE Prevention/Management:   bilateral   dorsiflexion/plantar flexion performed  Intervention: Prevent Infection  Recent Flowsheet Documentation  Taken 8/1/2022 0200 by Zhou Macedo RN  Infection Prevention:   environmental surveillance performed   rest/sleep promoted  Taken 8/1/2022 0000 by Zhou Macedo RN  Infection Prevention:   environmental surveillance performed   rest/sleep promoted  Taken 7/31/2022 2200 by Zhou Macedo RN  Infection Prevention:   environmental surveillance performed   rest/sleep promoted  Taken 7/31/2022 2000 by Zhou Macedo RN  Infection Prevention:   environmental surveillance performed   rest/sleep promoted  Goal: Optimal Comfort and Wellbeing  Outcome: Ongoing, Progressing  Intervention: Monitor Pain and Promote Comfort  Recent Flowsheet Documentation  Taken 7/31/2022 2000 by  Zhou Macedo, RN  Pain Management Interventions: quiet environment facilitated  Intervention: Provide Person-Centered Care  Recent Flowsheet Documentation  Taken 8/1/2022 0200 by Zhou Macedo RN  Trust Relationship/Rapport:   care explained   choices provided   reassurance provided   thoughts/feelings acknowledged  Taken 8/1/2022 0000 by Zhou Macedo RN  Trust Relationship/Rapport:   care explained   choices provided   reassurance provided   thoughts/feelings acknowledged  Taken 7/31/2022 2200 by Zhou Macedo RN  Trust Relationship/Rapport:   care explained   choices provided   reassurance provided   thoughts/feelings acknowledged  Goal: Readiness for Transition of Care  Outcome: Ongoing, Progressing     Problem: Anxiety  Goal: Anxiety Reduction or Resolution  Outcome: Ongoing, Progressing  Intervention: Promote Anxiety Reduction  Recent Flowsheet Documentation  Taken 8/1/2022 0200 by Zhou Macedo, RN  Family/Support System Care:   self-care encouraged   support provided  Taken 8/1/2022 0000 by Zhou Macedo, RN  Family/Support System Care:   self-care encouraged   support provided  Taken 7/31/2022 2200 by Zhou Macedo, RN  Family/Support System Care:   self-care encouraged   support provided  Taken 7/31/2022 2000 by Zhou Macedo, RN  Family/Support System Care:   self-care encouraged   support provided   Goal Outcome Evaluation:

## 2022-08-01 NOTE — DISCHARGE SUMMARY
[facility] Medicine Services  DISCHARGE SUMMARY    Patient Name: Emily Grant  : 1955  MRN: [id number]    Date of Admission: 2022  8:15 PM  Date of Discharge:  2022  Primary Care Physician: Jesse Reeves DO    Consults     Date and Time Order Name Status Description    2022  3:55 AM Inpatient Pulmonology Consult Completed           Hospital Course     Presenting Problem:   Lung mass [R91.8]    Active Hospital Problems    Diagnosis  POA   • **1.8 x 1.3 cm left lower lobe noncalcified nodule (2022) [R91.1]  Yes   • Compression fracture of body of thoracic vertebra (HCC) [S22.000A]  Yes   • COPD (chronic obstructive pulmonary disease) (HCC) [J44.9]  Yes   • HTN (hypertension) [I10]  Yes   • Anxiety disorder [F41.9]  Yes   • DDD (degenerative disc disease), thoracic [M51.34]  Yes      Resolved Hospital Problems   No resolved problems to display.          Hospital Course:  Emily Grant is a 67 y.o. female with past medical history of COPD, hypertension, anxiety, DDD presents to the ED for concern of lung mass.      Lung Mass  -Patient seen by Pulmonary. Concern for infectious process. Given IV Zosyn while admitted with plans for 7 days of oral Levaquin and 5 days of Prednisone at [place].   -Pt will follow-up with Dr Mcmillan 22 for follow-up on repeat outpatient CT and if needed, schedule biopsy   -Pt on RA        Recent Pneumonia   -Treated with full course oral doxy   -Negative for COVID  -Negative procal  -Doxy IV given in ED  -Per above      DDD  Compression Fracture of T10 and T12  -CT shows severe compression fractures at T10 and T12 with vertebral plana at T12. Retropulsion of T12 endplate by 9 mm into the canal contributes to likely moderate to severe canal stenosis  -Pt denies back pain  -Seen by NSGY, no surgical intervention neede d     COPD  Tobacco Abuse  -Continue home medications     Anxiety  -Continue home valium, lexapro, hydroxyzine, seroquel,  atenolol    LE edema  -Start Lasix 20mg daily + K+    Discharge Follow Up Recommendations for outpatient labs/diagnostics:  -PCP 1 week  -Dr Mcmillan 9/6/22    Day of Discharge     HPI:   Patient seen and examined. No new issues overnight. Already dressed. Up in bedside chair eating breakfast. Eager to go home today.     Review of Systems  Gen- No fevers, chills  CV- No chest pain, palpitations  Resp- No cough, dyspnea  GI- No N/V/D, abd pain    Vital Signs:   Temp:  [96.7 °F (35.9 °C)-98.5 °F (36.9 °C)] 98.5 °F (36.9 °C)  Heart Rate:  [69-87] 76  Resp:  [16-19] 19  BP: (142-156)/(69-88) 156/76  Flow (L/min):  [2] 2      Physical Exam:  Constitutional: No acute distress, awake, alert  HENT: NCAT, mucous membranes moist  Respiratory: Clear to auscultation bilaterally, respiratory effort normal   Cardiovascular: RRR, no murmurs, rubs, or gallops  Gastrointestinal: Positive bowel sounds, soft, nontender, nondistended  Musculoskeletal: +bilateral ankle edema  Psychiatric: Appropriate affect, cooperative  Neurologic: Oriented x 3, speech clear  Skin: No rashes    Pertinent  and/or Most Recent Results     LAB RESULTS:      Lab 07/31/22  0445 07/30/22 2031   WBC 5.91 8.72   HEMOGLOBIN 12.7 13.6   HEMATOCRIT 38.9 40.9   PLATELETS 292 307   NEUTROS ABS 2.95 5.56   IMMATURE GRANS (ABS) 0.01 0.02   LYMPHS ABS 2.10 2.18   MONOS ABS 0.56 0.71   EOS ABS 0.23 0.19   MCV 92.8 94.0   PROCALCITONIN  --  0.05   LACTATE  --  1.1   LDH  --  214         Lab 07/31/22  0445 07/30/22 2031   SODIUM 140 137   POTASSIUM 4.2 4.2   CHLORIDE 104 101   CO2 28.0 26.0   ANION GAP 8.0 10.0   BUN 7* 9   CREATININE 0.68 0.77   EGFR 95.0 84.1   GLUCOSE 106* 108*   CALCIUM 9.0 9.3         Lab 07/30/22 2031   TOTAL PROTEIN 6.7   ALBUMIN 3.90   GLOBULIN 2.8   ALT (SGPT) 9   AST (SGOT) 14   BILIRUBIN 0.2   ALK PHOS 109         Lab 07/30/22 2031   PROBNP 1,547.0*   TROPONIN T <0.010                 Brief Urine Lab Results  (Last result in the past 365  days)      Color   Clarity   Blood   Leuk Est   Nitrite   Protein   CREAT   Urine HCG        07/30/22 2355 Yellow   Clear   Negative   Negative   Negative   Negative               Microbiology Results (last 10 days)     Procedure Component Value - Date/Time    COVID PRE-OP / PRE-PROCEDURE SCREENING ORDER (NO ISOLATION) - Swab, Nasopharynx [[id number]]  (Normal) Collected: 07/31/22 0030    Lab Status: Final result Specimen: Swab from Nasopharynx Updated: 07/31/22 0106    Narrative:      The following orders were created for panel order COVID PRE-OP / PRE-PROCEDURE SCREENING ORDER (NO ISOLATION) - Swab, Nasopharynx.  Procedure                               Abnormality         Status                     ---------                               -----------         ------                     COVID-19 and FLU A/B PCR...[[id number]]  Normal              Final result                 Please view results for these tests on the individual orders.    COVID-19 and FLU A/B PCR - Swab, Nasopharynx [[id number]]  (Normal) Collected: 07/31/22 0030    Lab Status: Final result Specimen: Swab from Nasopharynx Updated: 07/31/22 0106     COVID19 Not Detected     Influenza A PCR Not Detected     Influenza B PCR Not Detected    Narrative:      Fact sheet for providers: https://www.fda.gov/media/291247/download    Fact sheet for patients: https://www.fda.gov/media/926781/download    Test performed by PCR.    Blood Culture - Blood, Wrist, Right [[id number]]  (Normal) Collected: 07/30/22 2350    Lab Status: Preliminary result Specimen: Blood from Wrist, Right Updated: 08/01/22 0017     Blood Culture No growth at 24 hours    Blood Culture - Blood, Hand, Right [[id number]]  (Normal) Collected: 07/30/22 2350    Lab Status: Preliminary result Specimen: Blood from Hand, Right Updated: 08/01/22 0017     Blood Culture No growth at 24 hours          CT Chest With Contrast Diagnostic    Result Date: 7/30/2022  DATE OF EXAM: 7/30/2022 3:47 PM  PROCEDURE: CT  CHEST W CONTRAST DIAGNOSTIC-  INDICATIONS: NEOPLASM OF UNCERTAIN BEHAVIOR OF TRACHEA, BRONCHUS AND LUNG; 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  TECHNIQUE: Routine transaxial slices were obtained through the chest after the intravenous administration of 75 mL of Isovue 300. Reconstructed coronal and sagittal images were also obtained. Automated exposure control and iterative construction methods were used.  The radiation dose reduction device was turned on for each scan per the ALARA (As Low as Reasonably Achievable) protocol.  FINDINGS: Visualized lower chest demonstrates patent thoracic aortic branch vasculature. Extensive aortic arch vascular calcifications. Heart size normal. Extensive coronary artery calcifications. Negative for pulmonary embolus. No axillary or internal mammary adenopathy. No pleural or pericardial effusion. Subcarinal node borderline enlarged measuring 10 mm. Right paratracheal node measures 6 mm. AP window node measures 8 mm.  Trachea and mainstem bronchi patent. There is scarring at the lung apices. Mild emphysema. No pneumothorax. Within the left lower lobe there is a area of soft tissue nodularity measuring 18 x 13 mm at left lower lobe bronchus bifurcation (axial image 48). There is a small subpleural right upper lobe nodule abutting the pleura measuring 4 mm on image 21. There is scarring at the lung apices. There are scattered calcified nodules related to granulomatous disease.  Upper abdomen demonstrates normal visualized portions of liver, spleen, and right adrenal gland. There is a small left adrenal gland nodule which is indeterminate measuring 10 mm. Pancreas without findings of pancreatitis. Gallbladder present. No pericholecystic inflammation. The upper poles of the kidneys are without acute abnormality. Extensive upper abdominal aortic vascular calcifications. No free fluid in upper abdomen. Bilateral  glenohumeral osteoarthritis. Large subchondral cysts at left glenoid.  There is a severe compression fracture at T10 with 80% central height loss without significant retropulsion of the superior endplate. There is a severe compression fracture at T12 with vertebral plana. There is retropulsion of the posterior T12 vertebral body into canal by 9 mm contributing to likely moderate to severe canal stenosis. Canal AP diameter is narrowed to 5 mm.      1. Irregular 1.8 x 1.3 cm pulmonary nodularity at left lower lobe may relate to pulmonary malignancy, consider PET/CT follow-up for further assessment or correlation with bronchoscopy. 2. Borderline enlarged mediastinal lymph nodes largest 10 mm in subcarinal region, these may also be further characterized with PET/CT. 3. Severe compression fractures at T10 and T12 with vertebral plana at T12. Retropulsion of T12 endplate by 9 mm into the canal contributes to likely moderate to severe canal stenosis. Neurosurgical follow-up and MRI of the thoracic spine may be beneficial to assess for cord compression. 4. Nonspecific left adrenal gland nodule measuring 10 mm. 5. Coronary atherosclerotic calcifications, emphysema, and additional chronic findings above.  The referring provider's office was closed at the time of this report. The patient is being contacted regarding the severe T12 compression fracture with instructions to go to the emergency room for additional evaluation.  This report was finalized on 7/30/2022 5:56 PM by Chacho Peterson MD.                    Plan for Follow-up of Pending Labs/Results: Inbox   Pending Labs     Order Current Status    CBC (No Diff) In process    Comprehensive Metabolic Panel In process    Blood Culture - Blood, Hand, Right Preliminary result    Blood Culture - Blood, Wrist, Right Preliminary result        Discharge Details        Discharge Medications      New Medications      Instructions Start Date   furosemide 20 MG tablet  Commonly known as:  Lasix   20 mg, Oral, Daily      levoFLOXacin 750 MG tablet  Commonly known as: Levaquin   750 mg, Oral, Daily      potassium chloride 10 MEQ CR tablet   10 mEq, Oral, Daily      predniSONE 20 MG tablet  Commonly known as: DELTASONE   40 mg, Oral, Daily         Changes to Medications      Instructions Start Date   QUEtiapine 50 MG tablet  Commonly known as: SEROquel  What changed: how much to take   50 mg, Oral, 2 Times Daily         Continue These Medications      Instructions Start Date   acetaminophen 500 MG tablet  Commonly known as: TYLENOL   1,000 mg, Oral, Every 4 Hours PRN      ALBUTEROL IN   Inhalation      albuterol sulfate  (90 Base) MCG/ACT inhaler  Commonly known as: PROVENTIL HFA;VENTOLIN HFA;PROAIR HFA   2 puffs, Inhalation, Every 4 Hours PRN      atenolol 25 MG tablet  Commonly known as: TENORMIN   25 mg, Oral, 2 Times Daily      Calcium + Vitamin D3 600-400 MG-UNIT tablet  Generic drug: Calcium Carbonate-Vitamin D3   calcium + vitamin d3 600-10   TAKE ONE TABLET BY MOUTH EVERY DAY      cholecalciferol 25 MCG (1000 UT) tablet  Commonly known as: VITAMIN D3   1,000 Units, Oral, Daily      diazePAM 5 MG tablet  Commonly known as: VALIUM   5 mg, Oral, 2 Times Daily PRN      hydrOXYzine 25 MG tablet  Commonly known as: ATARAX   25 mg, Oral, Nightly      hydrOXYzine 25 MG tablet  Commonly known as: ATARAX   50 mg, Oral, Every 6 Hours PRN      pantoprazole 40 MG EC tablet  Commonly known as: PROTONIX   40 mg, Oral, Daily      vitamin B-12 1000 MCG tablet  Commonly known as: CYANOCOBALAMIN   1,000 mcg, Oral, Daily         ASK your doctor about these medications      Instructions Start Date   Anoro Ellipta 62.5-25 MCG/INH aerosol powder  inhaler  Generic drug: umeclidinium-vilanterol   Anoro Ellipta 62.5 mcg-25 mcg/actuation powder for inhalation             No Known Allergies      Discharge Disposition:  Home or Self Care    Diet:  Hospital:  Diet Order   Procedures   • Diet Regular        Activity:      Restrictions or Other Recommendations:       CODE STATUS:    Code Status and Medical Interventions:   Ordered at: 07/31/22 0556     Level Of Support Discussed With:    Patient     Code Status (Patient has no pulse and is not breathing):    CPR (Attempt to Resuscitate)     Medical Interventions (Patient has pulse or is breathing):    Full Support       Future Appointments   Date Time Provider Department Center   9/6/2022 11:00 AM Ga Mcmillan DO [facility]       Additional Instructions for the Follow-ups that You Need to Schedule     Discharge Follow-up with PCP   As directed       Currently Documented PCP:    Jesse Reeves DO    PCP Phone Number:    [phone]     Follow Up Details: 1 week         Discharge Follow-up with Specified Provider: Dr Mcmillan as scheduled 9/6/22 at 11AM   As directed      To: Dr Mcmillan as scheduled 9/6/22 at 11AM                     Bere Pryor DO  08/01/22      Time Spent on Discharge:  I spent  45  minutes on this discharge activity which included: face-to-face encounter with the patient, reviewing the data in the system, coordination of the care with the nursing staff as well as consultants, documentation, and entering orders.

## 2022-08-01 NOTE — PLAN OF CARE
Problem: Adult Inpatient Plan of Care  Goal: Plan of Care Review  Outcome: Met  Goal: Patient-Specific Goal (Individualized)  Outcome: Met  Goal: Absence of Hospital-Acquired Illness or Injury  Outcome: Met  Intervention: Identify and Manage Fall Risk  Description: Perform standard risk assessment on admission using a validated tool or comprehensive approach appropriate to the patient; reassess fall risk frequently, with change in status or transfer to another level of care.  Communicate fall injury risk to interprofessional healthcare team.  Determine need for increased observation, equipment and environmental modification, such as low bed, signage and supportive, nonskid footwear.  Adjust safety measures to individual developmental age, stage and identified risk factors.  Reinforce the importance of safety and physical activity with patient and family.  Perform regular intentional rounding to assess need for position change, pain assessment and personal needs, including assistance with toileting.  Recent Flowsheet Documentation  Taken 8/1/2022 7630 by Norma Lawrence RN  Safety Promotion/Fall Prevention:   activity supervised   assistive device/personal items within reach   clutter free environment maintained   fall prevention program maintained   nonskid shoes/slippers when out of bed   room organization consistent   safety round/check completed  Intervention: Prevent Skin Injury  Description: Perform a screening for skin injury risk, such as pressure or moisture associated skin damage on admission and at regular intervals throughout hospital stay.  Keep all areas of skin (especially folds) clean and dry.  Maintain adequate skin hydration.  Relieve and redistribute pressure and protect bony prominences; implement measures based on patient-specific risk factors.  Match turning and repositioning schedule to clinical condition.  Encourage weight shift frequently; assist with reposition if unable to complete  independently.  Float heels off bed; avoid pressure on the Achilles tendon.  Keep skin free from extended contact with medical devices.  Encourage functional activity and mobility, as early as tolerated.  Use aids (e.g., slide boards, mechanical lift) during transfer.  Recent Flowsheet Documentation  Taken 8/1/2022 0730 by Norma Lawrence RN  Body Position:   position changed independently   sitting up in bed   dangle, side of bed  Skin Protection:   adhesive use limited   incontinence pads utilized   transparent dressing maintained   tubing/devices free from skin contact  Intervention: Prevent and Manage VTE (Venous Thromboembolism) Risk  Description: Assess for VTE (venous thromboembolism) risk.  Encourage and assist with early ambulation.  Initiate and maintain compression or other therapy, as indicated, based on identified risk in accordance with organizational protocol and provider order.  Encourage both active and passive leg exercises while in bed, if unable to ambulate.  Recent Flowsheet Documentation  Taken 8/1/2022 0730 by Norma Lawrence RN  Activity Management: activity adjusted per tolerance  VTE Prevention/Management:   bilateral   dorsiflexion/plantar flexion performed   bleeding risk factor(s) identified  Intervention: Prevent Infection  Description: Maintain skin and mucous membrane integrity; promote hand, oral and pulmonary hygiene.  Optimize fluid balance, nutrition, sleep and glycemic control to maximize infection resistance.  Identify potential sources of infection early to prevent or mitigate progression of infection (e.g., wound, lines, devices).  Evaluate ongoing need for invasive devices; remove promptly when no longer indicated.  Recent Flowsheet Documentation  Taken 8/1/2022 0730 by Norma Lawrence RN  Infection Prevention:   rest/sleep promoted   environmental surveillance performed   cohorting utilized  Goal: Optimal Comfort and Wellbeing  Outcome: Met  Intervention:  Provide Person-Centered Care  Description: Use a family-focused approach to care.  Develop trust and rapport by proactively providing information, encouraging questions, addressing concerns and offering reassurance.  Acknowledge emotional response to hospitalization.  Recognize and utilize personal coping strategies.  Honor spiritual and cultural preferences.  Recent Flowsheet Documentation  Taken 8/1/2022 1730 by Norma Lawrence RN  Trust Relationship/Rapport:   care explained   choices provided   empathic listening provided   questions answered   thoughts/feelings acknowledged  Goal: Readiness for Transition of Care  Outcome: Met   Goal Outcome Evaluation:

## 2022-08-05 LAB
BACTERIA SPEC AEROBE CULT: NORMAL
BACTERIA SPEC AEROBE CULT: NORMAL

## 2022-08-22 DIAGNOSIS — R91.1 LUNG NODULE: Primary | ICD-10-CM

## 2022-08-23 LAB — CREAT BLDA-MCNC: 0.7 MG/DL (ref 0.6–1.3)

## 2022-09-01 ENCOUNTER — APPOINTMENT (OUTPATIENT)
Dept: CT IMAGING | Facility: HOSPITAL | Age: 67
End: 2022-09-01

## 2022-09-02 ENCOUNTER — OFFICE VISIT (OUTPATIENT)
Dept: PULMONOLOGY | Facility: CLINIC | Age: 67
End: 2022-09-02

## 2022-09-02 VITALS
SYSTOLIC BLOOD PRESSURE: 144 MMHG | OXYGEN SATURATION: 95 % | TEMPERATURE: 97.8 F | RESPIRATION RATE: 16 BRPM | WEIGHT: 143 LBS | HEART RATE: 63 BPM | BODY MASS INDEX: 28.07 KG/M2 | HEIGHT: 60 IN | DIASTOLIC BLOOD PRESSURE: 98 MMHG

## 2022-09-02 DIAGNOSIS — J44.9 CHRONIC OBSTRUCTIVE PULMONARY DISEASE, UNSPECIFIED COPD TYPE: ICD-10-CM

## 2022-09-02 DIAGNOSIS — R91.1 LUNG NODULE: Primary | ICD-10-CM

## 2022-09-02 PROCEDURE — 99214 OFFICE O/P EST MOD 30 MIN: CPT | Performed by: NURSE PRACTITIONER

## 2022-09-02 RX ORDER — QUETIAPINE FUMARATE 25 MG/1
TABLET, FILM COATED ORAL
COMMUNITY
End: 2023-03-31 | Stop reason: SDUPTHER

## 2022-09-02 RX ORDER — MECLIZINE HYDROCHLORIDE 25 MG/1
TABLET ORAL
COMMUNITY
End: 2023-03-31 | Stop reason: SDUPTHER

## 2022-09-02 RX ORDER — ESCITALOPRAM OXALATE 20 MG/1
TABLET ORAL
COMMUNITY
End: 2023-03-31 | Stop reason: SDUPTHER

## 2022-09-02 RX ORDER — ALBUTEROL SULFATE 2.5 MG/3ML
SOLUTION RESPIRATORY (INHALATION)
COMMUNITY

## 2022-09-06 NOTE — PROGRESS NOTES
[facility] Pulmonary Follow up    CHIEF COMPLAINT    Dyspnea with exertion    HISTORY OF PRESENT ILLNESS    Emily Grant is a 67 y.o.female here today for a hospital follow-up.  She was hospitalized at [facility] for a COPD exacerbation.  She had a CT scan that showed a 1.8 x 1.3 cm left lower lobe nodule.  Dr. Mcmillan evaluated her while hospitalized and recommended repeat CT scan for close follow-up.  Unfortunately her insurance would not approve a CT scan without a chest x-ray.  She had a chest x-ray today and is here for follow-up.    She is completed her medications from her hospitalization and does feel much improved.  She is not currently having any sputum production or hemoptysis.    She denies fever or chills.  She does have some mild dyspnea with exertion but does recover fairly quickly at rest.    She continues to use her Anoro daily.  She also has her albuterol to use occasionally for shortness of breath.    She denies any sleeping difficulties.    She continues to smoke daily.  She has a 50-pack-year history.    She is up-to-date on her current vaccinations.    Patient Active Problem List   Diagnosis   • 1.8 x 1.3 cm left lower lobe noncalcified nodule (7/2022)   • Compression fracture of body of thoracic vertebra (HCC)   • COPD (chronic obstructive pulmonary disease) (HCC)   • HTN (hypertension)   • Anxiety disorder   • DDD (degenerative disc disease), thoracic       No Known Allergies    Current Outpatient Medications:   •  acetaminophen (TYLENOL) 500 MG tablet, Take 1,000 mg by mouth Every 4 (Four) Hours As Needed for Mild Pain ., Disp: , Rfl:   •  albuterol (PROVENTIL) (2.5 MG/3ML) 0.083% nebulizer solution, albuterol sulfate 2.5 mg/3 mL (0.083 %) solution for nebulization  Inhale 3 mL 3 times a day by nebulization route for 30 days., Disp: , Rfl:   •  albuterol sulfate  (90 Base) MCG/ACT inhaler, Inhale 2 puffs Every 4 (Four) Hours As Needed for Wheezing., Disp: 6.7 g, Rfl:  0  •  atenolol (TENORMIN) 25 MG tablet, Take 1 tablet by mouth 2 (Two) Times a Day., Disp: 28 tablet, Rfl: 0  •  Calcium Carbonate-Vitamin D3 600-400 MG-UNIT tablet, calcium + vitamin d3 600-10  TAKE ONE TABLET BY MOUTH EVERY DAY, Disp: , Rfl:   •  cholecalciferol (VITAMIN D3) 25 MCG (1000 UT) tablet, Take 1,000 Units by mouth Daily., Disp: , Rfl:   •  diazePAM (VALIUM) 5 MG tablet, Take 5 mg by mouth 2 (Two) Times a Day As Needed for Anxiety., Disp: , Rfl:   •  escitalopram (LEXAPRO) 20 MG tablet, escitalopram 20 mg tablet  TAKE ONE TABLET BY MOUTH ONCE DAILY, Disp: , Rfl:   •  furosemide (Lasix) 20 MG tablet, Take 1 tablet by mouth Daily., Disp: 30 tablet, Rfl: 0  •  hydrOXYzine (ATARAX) 25 MG tablet, Take 2 tablets by mouth Every 6 (Six) Hours As Needed for Itching., Disp: 30 tablet, Rfl: 0  •  meclizine (ANTIVERT) 25 MG tablet, meclizine 25 mg tablet  Take 1 tablet every day by oral route as needed for 30 days., Disp: , Rfl:   •  pantoprazole (PROTONIX) 40 MG EC tablet, Take 40 mg by mouth Daily., Disp: , Rfl:   •  potassium chloride 10 MEQ CR tablet, Take 1 tablet by mouth Daily., Disp: 30 tablet, Rfl: 0  •  QUEtiapine (SEROquel) 25 MG tablet, quetiapine 25 mg tablet  TAKE ONE (1) TABLET BY MOUTH TWICE DAILY, Disp: , Rfl:   •  QUEtiapine (SEROquel) 50 MG tablet, Take 1 tablet by mouth 2 (Two) Times a Day. (Patient taking differently: Take 25 mg by mouth 2 (Two) Times a Day.), Disp: 28 tablet, Rfl: 0  •  umeclidinium-vilanterol (Anoro Ellipta) 62.5-25 MCG/INH aerosol powder  inhaler, Anoro Ellipta 62.5 mcg-25 mcg/actuation powder for inhalation, Disp: , Rfl:   •  vitamin B-12 (CYANOCOBALAMIN) 1000 MCG tablet, Take 1,000 mcg by mouth Daily., Disp: , Rfl:   MEDICATION LIST AND ALLERGIES REVIEWED.    Social History     Tobacco Use   • Smoking status: Current Every Day Smoker     Packs/day: 1.00     Years: 50.00     Pack years: 50.00     Types: Cigarettes   • Smokeless tobacco: Never Used   Vaping Use   • Vaping  "Use: Never used   Substance Use Topics   • Alcohol use: Never   • Drug use: Never       FAMILY AND SOCIAL HISTORY REVIEWED.    Review of Systems   Constitutional: Positive for fatigue. Negative for activity change, appetite change, fever and unexpected weight change.   HENT: Negative for congestion, postnasal drip, rhinorrhea, sinus pressure, sore throat and voice change.    Eyes: Negative for visual disturbance.   Respiratory: Positive for shortness of breath. Negative for cough, chest tightness and wheezing.    Cardiovascular: Negative for chest pain, palpitations and leg swelling.   Gastrointestinal: Negative for abdominal distention, abdominal pain, nausea and vomiting.   Endocrine: Negative for cold intolerance and heat intolerance.   Genitourinary: Negative for difficulty urinating and urgency.   Musculoskeletal: Negative for arthralgias, back pain and neck pain.   Skin: Negative for color change and pallor.   Allergic/Immunologic: Negative for environmental allergies and food allergies.   Neurological: Negative for dizziness, syncope, weakness and light-headedness.   Hematological: Negative for adenopathy. Does not bruise/bleed easily.   Psychiatric/Behavioral: Negative for agitation and behavioral problems.   .    /98 (BP Location: Left arm, Patient Position: Sitting, Cuff Size: Adult)   Pulse 63   Temp 97.8 °F (36.6 °C)   Resp 16   Ht 152.4 cm (60\")   Wt 64.9 kg (143 lb)   SpO2 95% Comment: room air at rest  BMI 27.93 kg/m²       There is no immunization history on file for this patient.    Physical Exam  Vitals and nursing note reviewed.   Constitutional:       Appearance: She is well-developed. She is not diaphoretic.   HENT:      Head: Normocephalic and atraumatic.   Eyes:      Pupils: Pupils are equal, round, and reactive to light.   Neck:      Thyroid: No thyromegaly.   Cardiovascular:      Rate and Rhythm: Normal rate and regular rhythm.      Heart sounds: Normal heart sounds. No murmur " heard.    No friction rub. No gallop.   Pulmonary:      Effort: Pulmonary effort is normal. No respiratory distress.      Breath sounds: Normal breath sounds. No wheezing or rales.   Chest:      Chest wall: No tenderness.   Abdominal:      General: Bowel sounds are normal.      Palpations: Abdomen is soft.      Tenderness: There is no abdominal tenderness.   Musculoskeletal:         General: No swelling. Normal range of motion.      Cervical back: Normal range of motion and neck supple.   Lymphadenopathy:      Cervical: No cervical adenopathy.   Skin:     General: Skin is warm and dry.      Capillary Refill: Capillary refill takes less than 2 seconds.   Neurological:      Mental Status: She is alert and oriented to person, place, and time.   Psychiatric:         Mood and Affect: Mood normal.         Behavior: Behavior normal.           RESULTS    CT Chest With Contrast Diagnostic    Result Date: 7/30/2022  1. Irregular 1.8 x 1.3 cm pulmonary nodularity at left lower lobe may relate to pulmonary malignancy, consider PET/CT follow-up for further assessment or correlation with bronchoscopy. 2. Borderline enlarged mediastinal lymph nodes largest 10 mm in subcarinal region, these may also be further characterized with PET/CT. 3. Severe compression fractures at T10 and T12 with vertebral plana at T12. Retropulsion of T12 endplate by 9 mm into the canal contributes to likely moderate to severe canal stenosis. Neurosurgical follow-up and MRI of the thoracic spine may be beneficial to assess for cord compression. 4. Nonspecific left adrenal gland nodule measuring 10 mm. 5. Coronary atherosclerotic calcifications, emphysema, and additional chronic findings above.  The referring provider's office was closed at the time of this report. The patient is being contacted regarding the severe T12 compression fracture with instructions to go to the emergency room for additional evaluation.  This report was finalized on 7/30/2022 5:56  PM by Chacho Peterson MD.      XR Chest PA & Lateral    Result Date: 9/2/2022  No acute abnormality is demonstrated radiographically. The nodular opacity in the left infrahilar/lower lobe region seen on chest CT from one month ago is still likely present but difficult to adequately evaluate radiographically chest CT with contrast would be better able to assess for any changes in this area. No new findings.  This report was finalized on 9/2/2022 4:44 PM by Arie Paez DO.      PROBLEM LIST    Problem List Items Addressed This Visit        Pulmonary and Pneumonias    1.8 x 1.3 cm left lower lobe noncalcified nodule (7/2022) - Primary    Relevant Orders    XR Chest PA & Lateral (Completed)    CT Chest Without Contrast    COPD (chronic obstructive pulmonary disease) (HCC)    Relevant Medications    albuterol (PROVENTIL) (2.5 MG/3ML) 0.083% nebulizer solution            DISCUSSION  Ms. Grant is here for follow-up.  She seems to be slowly improving back to her baseline.  She we will continue Anoro daily.    We did review her chest x-ray in the office today in the nodular opacity continues to be present but is difficult to evaluate.  I will proceed with a CT of the chest without contrast.  We will hopefully get this scheduled in the next week.  If it is decreasing in size we will continue to follow closely.  If it has not changed we will proceed with a biopsy.  I will call her with results of her CT scan once I receive them.    She will continue to use her albuterol nebulizers as needed.  I did send in refills for her today.    We did discuss smoking cessation in the office for 3 minutes.  She does not stop date planned.    She will follow-up with Dr. Mcmillan in 2 to 3 months or sooner if her symptoms worsen.  Advised her to call with any additional concerns or questions.    Level of service justified based on 35 minutes spent in patient care on this date of service including, but not limited to: preparing to see the  patient, obtaining and/or reviewing history, performing medically appropriate examination, ordering tests/medicine/procedures, independently interpreting results, documenting clinical information in EHR, and counseling/education of patient/family/caregiver. (Level 4 30-39 minutes; Level 5 40-54 minutes)      Eileen Cisneros, GUNJAN  09/02/202210:31 EDT  Electronically signed     Please note that portions of this note were completed with a voice recognition program.        CC: Jesse Reeves, DO

## 2023-01-01 ENCOUNTER — TELEPHONE (OUTPATIENT)
Dept: FAMILY MEDICINE CLINIC | Facility: CLINIC | Age: 68
End: 2023-01-01

## 2023-01-01 RX ORDER — DIAZEPAM 5 MG/1
5 TABLET ORAL 2 TIMES DAILY PRN
Status: CANCELLED | OUTPATIENT
Start: 2023-01-01

## 2023-01-17 ENCOUNTER — NURSE TRIAGE (OUTPATIENT)
Dept: CALL CENTER | Facility: HOSPITAL | Age: 68
End: 2023-01-17
Payer: MEDICARE

## 2023-01-17 NOTE — TELEPHONE ENCOUNTER
"Caller concerned about possible stroke. Having trouble walking, gets dizzy when she stands and has to take her Meclizine. Caller states she cannot stand without assistance of her cane, this started yesterday morning when she fell out of her tub and is continuing now. Reviewed guideline with caller, advises she call EMS for transport to ED for evaluation of stroke type symptoms. Caller agrees to follow care advice.     Reason for Disposition  • [1] Weakness (i.e., paralysis, loss of muscle strength) of the face, arm / hand, or leg / foot on one side of the body AND [2] sudden onset AND [3] present now (Exception: Bell's palsy suspected [i.e., weakness only one side of the face, developing over hours to days, no other symptoms])    Additional Information  • Negative: [1] SEVERE weakness (i.e., unable to walk or barely able to walk, requires support) AND [2] new-onset or worsening    Answer Assessment - Initial Assessment Questions  1. SYMPTOM: \"What is the main symptom you are concerned about?\" (e.g., weakness, numbness)      Difficulty walking  2. ONSET: \"When did this start?\" (minutes, hours, days; while sleeping)      Yesterday states she fell out of the tub  3. LAST NORMAL: \"When was the last time you were normal (no symptoms)?\"      yesterday  4. PATTERN \"Does this come and go, or has it been constant since it started?\"  \"Is it present now?\"      Constant   5. CARDIAC SYMPTOMS: \"Have you had any of the following symptoms: chest pain, difficulty breathing, palpitations?\"      no  6. NEUROLOGIC SYMPTOMS: \"Have you had any of the following symptoms: headache, dizziness, vision loss, double vision, changes in speech, unsteady on your feet?\"      Unsteady on her feet  7. OTHER SYMPTOMS: \"Do you have any other symptoms?\"      no  8. PREGNANCY: \"Is there any chance you are pregnant?\" \"When was your last menstrual period?\"      no    Protocols used: NEUROLOGIC DEFICIT-ADULT-AH      "

## 2023-02-02 NOTE — TELEPHONE ENCOUNTER
Caller: Emily Grant VEE    Relationship: Self    Best call back number: [phone]    Requested Prescriptions:   Requested Prescriptions     Pending Prescriptions Disp Refills   • diazePAM (VALIUM) 5 MG tablet       Sig: Take 1 tablet by mouth 2 (Two) Times a Day As Needed for Anxiety.        Pharmacy where request should be sent: [facility] [place] - [address] [facility] - [phone]  - [phone] FX     Additional details provided by patient: EMILY SAYS SHE HAS OUT OF THIS FOR 1 MONTH AND NEEDS A REFILL.   DR GAMEZ  MILES AWAY. CAN YOU HELP?  SHE HAS A NEW PATIENT APPT 335832.    SHE IS SO ANXIOUS SHE CAN'T HOLD A SPOON WITHOUT SPILLING HER SOUP.    Does the patient have less than a 3 day supply:  [x] Yes  [] No    Would you like a call back once the refill request has been completed: [x] Yes [] No    If the office needs to give you a call back, can they leave a voicemail: [x] Yes [] No    Mario Alberto Lew Rep   02/02/23 10:39 EST

## 2023-02-03 ENCOUNTER — HOSPITAL ENCOUNTER (EMERGENCY)
Facility: HOSPITAL | Age: 68
Discharge: HOME | End: 2023-02-03
Attending: EMERGENCY MEDICINE | Admitting: EMERGENCY MEDICINE
Payer: MEDICARE

## 2023-02-03 VITALS
HEIGHT: 61 IN | DIASTOLIC BLOOD PRESSURE: 52 MMHG | TEMPERATURE: 98.2 F | WEIGHT: 110 LBS | HEART RATE: 84 BPM | OXYGEN SATURATION: 93 % | SYSTOLIC BLOOD PRESSURE: 110 MMHG | BODY MASS INDEX: 20.77 KG/M2 | RESPIRATION RATE: 16 BRPM
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
## 2023-02-03 DIAGNOSIS — G47.9 DIFFICULTY SLEEPING: Primary | ICD-10-CM

## 2023-02-03 PROCEDURE — 99283 EMERGENCY DEPT VISIT LOW MDM: CPT

## 2023-02-03 NOTE — ED PROVIDER NOTES
TRIAGE CHIEF COMPLAINT:     Nursing and triage notes reviewed    Chief Complaint   Patient presents with   • Altered Mental Status   • Fall      HPI: Emily Grant is a 68 y.o. female who presents to the emergency department complaining of a fall, difficulty sleeping, possible manic episode.  Patient states she had a fall this morning around 8 and fell backwards.  She states she hurt her right hip and hit her head.  She states she just has scratches and is not concerned about any of these findings.  She states she has been having trouble sleeping for the past few weeks.  She states she has a history of depression but does not believe that she has a history of bipolar disorder.  Patient's son had asked patient to come in to be evaluated for possible placement.  On arrival patient states she does not feel she needs to be here other than to get some medicine to help her sleep.  She states she has an upcoming appointment next week.  She denies any other complaint.    REVIEW OF SYSTEMS: All other systems reviewed and are negative     PAST MEDICAL HISTORY:   Past Medical History:   Diagnosis Date   • Anxiety    • Bipolar disorder, current condition not specified as either manic or depressive    • COPD (chronic obstructive pulmonary disease) (HCC)    • Coronary artery disease    • Hypotension    • Tachycardia         FAMILY HISTORY:   Family History   Problem Relation Age of Onset   • COPD Mother    • Heart failure Mother    • Heart disease Father    • Dementia Father    • Heart attack Father         SOCIAL HISTORY:   Social History     Socioeconomic History   • Marital status:    Tobacco Use   • Smoking status: Every Day     Packs/day: 1.00     Years: 50.00     Pack years: 50.00     Types: Cigarettes   • Smokeless tobacco: Never   Vaping Use   • Vaping Use: Never used   Substance and Sexual Activity   • Alcohol use: Never   • Drug use: Never   • Sexual activity: Defer        SURGICAL HISTORY:   Past Surgical  History:   Procedure Laterality Date   • TOTAL HIP ARTHROPLASTY          CURRENT MEDICATIONS:      Medication List      ASK your doctor about these medications    acetaminophen 500 MG tablet  Commonly known as: TYLENOL     * albuterol (2.5 MG/3ML) 0.083% nebulizer solution  Commonly known as: PROVENTIL     * albuterol sulfate  (90 Base) MCG/ACT inhaler  Commonly known as: PROVENTIL HFA;VENTOLIN HFA;PROAIR HFA  Inhale 2 puffs Every 4 (Four) Hours As Needed for Wheezing.     Anoro Ellipta 62.5-25 MCG/ACT aerosol powder  inhaler  Generic drug: Umeclidinium-Vilanterol     atenolol 25 MG tablet  Commonly known as: TENORMIN  Take 1 tablet by mouth 2 (Two) Times a Day.     Calcium Carbonate-Vitamin D3 600-400 MG-UNIT tablet     cholecalciferol 25 MCG (1000 UT) tablet  Commonly known as: VITAMIN D3     diazePAM 5 MG tablet  Commonly known as: VALIUM     escitalopram 20 MG tablet  Commonly known as: LEXAPRO     furosemide 20 MG tablet  Commonly known as: Lasix  Take 1 tablet by mouth Daily.     hydrOXYzine 25 MG tablet  Commonly known as: ATARAX  Take 2 tablets by mouth Every 6 (Six) Hours As Needed for Itching.     meclizine 25 MG tablet  Commonly known as: ANTIVERT     pantoprazole 40 MG EC tablet  Commonly known as: PROTONIX     potassium chloride 10 MEQ CR tablet  Take 1 tablet by mouth Daily.     * QUEtiapine 25 MG tablet  Commonly known as: SEROquel     * QUEtiapine 50 MG tablet  Commonly known as: SEROquel  Take 1 tablet by mouth 2 (Two) Times a Day.     vitamin B-12 1000 MCG tablet  Commonly known as: CYANOCOBALAMIN         * This list has 4 medication(s) that are the same as other medications prescribed for you. Read the directions carefully, and ask your doctor or other care provider to review them with you.                 ALLERGIES: Patient has no known allergies.     PHYSICAL EXAM:   VITAL SIGNS:   Vitals:    02/03/23 1212   BP: 110/52   Pulse: 84   Resp: 16   Temp: 98.2 °F (36.8 °C)   SpO2: 96%       CONSTITUTIONAL: Awake, oriented, appears nontoxic, normal mood  HENT: Atraumatic, normocephalic, oral mucosa pink and moist, airway patent. Nares patent without drainage. External ears normal.   EYES: Conjunctivae clear,  NECK: Trachea midline, nontender, supple   CARDIOVASCULAR: Normal heart rate, Normal rhythm, No murmurs, rubs, gallops   PULMONARY/CHEST: Clear to auscultation, no rhonchi, wheezes, or rales. Symmetrical breath sounds.  ABDOMINAL: Nondistended, soft, nontender - no rebound or guarding.   NEUROLOGIC: Nonfocal, moving all four extremities, no gross sensory or motor deficits.   EXTREMITIES: No clubbing, cyanosis, or edema.  No significant tenderness on the bilateral hips.  SKIN: Warm, Dry, No erythema, No rash     ED COURSE / MEDICAL DECISION MAKING:   Emily Grant is a 68 y.o. female who presents to the emergency department for evaluation of difficulty sleeping.  Patient is nondistressed on arrival in the emergency department.  Vital signs are stable on arrival.  Patient is alert and oriented and answers all of my questions appropriately.  Her exam is otherwise largely unremarkable.  Patient reiterates that she does not feel she needs any current testing done and refuses all imaging studies, blood work, urinalysis testing.    Differential diagnosis includes fall, depression, contusion, fracture, infection among other etiologies.    I had intended to order imaging and laboratory test as well as an EKG, however patient refused all of these tests.    Diagnostic information from other sources: Patients son, EMS    Interventions: None    Narrative: Patient is awake, alert, oriented and answers questions appropriately.  She is felt to have capacity to make her own medical decisions at this time.  I do believe patient would benefit from further work-up and possible behavioral health evaluation if she does not have a medical cause of her symptoms.  I also think she could use to rule out of any bony or  other injury however she continues to refuse.  At this time I do not feel that I have the ability to hold patient.  For this reason we will allow her to be discharged at this time given that she states she does have a follow-up appointment soon.  I have advised her to return at any point if she changes her mind.    Re-evaluation: Sitting comfortably    Plan for disposition is home with follow-up unless patient changes her mind and allows us to have further work-up at this time.    DECISION TO DISCHARGE/ADMIT: see ED care timeline     FINAL IMPRESSION:   1 --difficulty sleeping  2 --fall  3 --     Electronically signed by: Veronica Emerson MD, 2/3/2023 13:21 Veronica Wheat MD  02/03/23 7051

## 2023-03-17 ENCOUNTER — HOSPITAL ENCOUNTER (OUTPATIENT)
Facility: HOSPITAL | Age: 68
Setting detail: OBSERVATION
LOS: 1 days | Discharge: HOME | End: 2023-03-20
Attending: EMERGENCY MEDICINE | Admitting: PEDIATRICS
Payer: MEDICARE

## 2023-03-17 ENCOUNTER — APPOINTMENT (OUTPATIENT)
Dept: CT IMAGING | Facility: HOSPITAL | Age: 68
End: 2023-03-17
Payer: MEDICARE

## 2023-03-17 ENCOUNTER — APPOINTMENT (OUTPATIENT)
Dept: GENERAL RADIOLOGY | Facility: HOSPITAL | Age: 68
End: 2023-03-17
Payer: MEDICARE

## 2023-03-17 DIAGNOSIS — J44.9 CHRONIC OBSTRUCTIVE PULMONARY DISEASE, UNSPECIFIED COPD TYPE: ICD-10-CM

## 2023-03-17 DIAGNOSIS — R91.8 MASS OF LEFT LUNG: ICD-10-CM

## 2023-03-17 DIAGNOSIS — R41.82 ALTERED MENTAL STATUS, UNSPECIFIED ALTERED MENTAL STATUS TYPE: Primary | ICD-10-CM

## 2023-03-17 DIAGNOSIS — E87.6 HYPOKALEMIA: ICD-10-CM

## 2023-03-17 DIAGNOSIS — F31.60 BIPOLAR AFFECTIVE DISORDER, CURRENT EPISODE MIXED, CURRENT EPISODE SEVERITY UNSPECIFIED: ICD-10-CM

## 2023-03-17 DIAGNOSIS — R53.1 GENERALIZED WEAKNESS: ICD-10-CM

## 2023-03-17 PROBLEM — R79.89 ELEVATED TROPONIN: Status: ACTIVE | Noted: 2023-01-01

## 2023-03-17 PROBLEM — Z72.0 TOBACCO USE: Status: ACTIVE | Noted: 2023-03-17

## 2023-03-17 PROBLEM — R77.8 ELEVATED TROPONIN: Status: ACTIVE | Noted: 2023-03-17

## 2023-03-17 PROBLEM — F31.62 BIPOLAR DISORDER, CURRENT EPISODE MIXED, MODERATE: Status: ACTIVE | Noted: 2023-03-17

## 2023-03-17 LAB
ALBUMIN SERPL-MCNC: 3.6 G/DL (ref 3.5–5.2)
ALBUMIN/GLOB SERPL: 1.2 G/DL
ALP SERPL-CCNC: 91 U/L (ref 39–117)
ALT SERPL W P-5'-P-CCNC: 6 U/L (ref 1–33)
ANION GAP SERPL CALCULATED.3IONS-SCNC: 11 MMOL/L (ref 5–15)
AST SERPL-CCNC: 11 U/L (ref 1–32)
BASOPHILS # BLD AUTO: 0.02 10*3/MM3 (ref 0–0.2)
BASOPHILS NFR BLD AUTO: 0.2 % (ref 0–1.5)
BILIRUB SERPL-MCNC: 0.4 MG/DL (ref 0–1.2)
BILIRUB UR QL STRIP: NEGATIVE
BUN SERPL-MCNC: 17 MG/DL (ref 8–23)
BUN/CREAT SERPL: 22.7 (ref 7–25)
CALCIUM SPEC-SCNC: 10 MG/DL (ref 8.6–10.5)
CHLORIDE SERPL-SCNC: 100 MMOL/L (ref 98–107)
CLARITY UR: CLEAR
CO2 SERPL-SCNC: 26 MMOL/L (ref 22–29)
COLOR UR: YELLOW
CREAT SERPL-MCNC: 0.75 MG/DL (ref 0.57–1)
DEPRECATED RDW RBC AUTO: 47.8 FL (ref 37–54)
EGFRCR SERPLBLD CKD-EPI 2021: 86.3 ML/MIN/1.73
EOSINOPHIL # BLD AUTO: 0.01 10*3/MM3 (ref 0–0.4)
EOSINOPHIL NFR BLD AUTO: 0.1 % (ref 0.3–6.2)
ERYTHROCYTE [DISTWIDTH] IN BLOOD BY AUTOMATED COUNT: 13.3 % (ref 12.3–15.4)
GLOBULIN UR ELPH-MCNC: 3.1 GM/DL
GLUCOSE SERPL-MCNC: 125 MG/DL (ref 65–99)
GLUCOSE UR STRIP-MCNC: NEGATIVE MG/DL
HCT VFR BLD AUTO: 37.4 % (ref 34–46.6)
HGB BLD-MCNC: 12.2 G/DL (ref 12–15.9)
HGB UR QL STRIP.AUTO: NEGATIVE
HOLD SPECIMEN: NORMAL
IMM GRANULOCYTES # BLD AUTO: 0.02 10*3/MM3 (ref 0–0.05)
IMM GRANULOCYTES NFR BLD AUTO: 0.2 % (ref 0–0.5)
KETONES UR QL STRIP: NEGATIVE
LEUKOCYTE ESTERASE UR QL STRIP.AUTO: NEGATIVE
LYMPHOCYTES # BLD AUTO: 0.9 10*3/MM3 (ref 0.7–3.1)
LYMPHOCYTES NFR BLD AUTO: 11.1 % (ref 19.6–45.3)
MAGNESIUM SERPL-MCNC: 1.5 MG/DL (ref 1.6–2.4)
MAGNESIUM SERPL-MCNC: 2 MG/DL (ref 1.6–2.4)
MCH RBC QN AUTO: 31.8 PG (ref 26.6–33)
MCHC RBC AUTO-ENTMCNC: 32.6 G/DL (ref 31.5–35.7)
MCV RBC AUTO: 97.4 FL (ref 79–97)
MONOCYTES # BLD AUTO: 0.8 10*3/MM3 (ref 0.1–0.9)
MONOCYTES NFR BLD AUTO: 9.8 % (ref 5–12)
NEUTROPHILS NFR BLD AUTO: 6.38 10*3/MM3 (ref 1.7–7)
NEUTROPHILS NFR BLD AUTO: 78.6 % (ref 42.7–76)
NITRITE UR QL STRIP: NEGATIVE
NRBC BLD AUTO-RTO: 0 /100 WBC (ref 0–0.2)
PH UR STRIP.AUTO: 6.5 [PH] (ref 5–8)
PLATELET # BLD AUTO: 198 10*3/MM3 (ref 140–450)
PMV BLD AUTO: 10.6 FL (ref 6–12)
POTASSIUM SERPL-SCNC: 2.9 MMOL/L (ref 3.5–5.2)
PROT SERPL-MCNC: 6.7 G/DL (ref 6–8.5)
PROT UR QL STRIP: ABNORMAL
QT INTERVAL: 358 MS
QTC INTERVAL: 491 MS
RBC # BLD AUTO: 3.84 10*6/MM3 (ref 3.77–5.28)
SODIUM SERPL-SCNC: 137 MMOL/L (ref 136–145)
SP GR UR STRIP: 1.02 (ref 1–1.03)
TROPONIN T SERPL HS-MCNC: 20 NG/L
TROPONIN T SERPL HS-MCNC: 21 NG/L
UROBILINOGEN UR QL STRIP: ABNORMAL
WBC NRBC COR # BLD: 8.13 10*3/MM3 (ref 3.4–10.8)
WHOLE BLOOD HOLD COAG: NORMAL
WHOLE BLOOD HOLD SPECIMEN: NORMAL

## 2023-03-17 PROCEDURE — 96366 THER/PROPH/DIAG IV INF ADDON: CPT

## 2023-03-17 PROCEDURE — 71045 X-RAY EXAM CHEST 1 VIEW: CPT

## 2023-03-17 PROCEDURE — 84484 ASSAY OF TROPONIN QUANT: CPT | Performed by: NURSE PRACTITIONER

## 2023-03-17 PROCEDURE — 83735 ASSAY OF MAGNESIUM: CPT | Performed by: EMERGENCY MEDICINE

## 2023-03-17 PROCEDURE — 96367 TX/PROPH/DG ADDL SEQ IV INF: CPT

## 2023-03-17 PROCEDURE — 99223 1ST HOSP IP/OBS HIGH 75: CPT | Performed by: NURSE PRACTITIONER

## 2023-03-17 PROCEDURE — 93005 ELECTROCARDIOGRAM TRACING: CPT | Performed by: EMERGENCY MEDICINE

## 2023-03-17 PROCEDURE — 85025 COMPLETE CBC W/AUTO DIFF WBC: CPT | Performed by: EMERGENCY MEDICINE

## 2023-03-17 PROCEDURE — 96361 HYDRATE IV INFUSION ADD-ON: CPT

## 2023-03-17 PROCEDURE — 71250 CT THORAX DX C-: CPT

## 2023-03-17 PROCEDURE — G0378 HOSPITAL OBSERVATION PER HR: HCPCS

## 2023-03-17 PROCEDURE — 81003 URINALYSIS AUTO W/O SCOPE: CPT | Performed by: EMERGENCY MEDICINE

## 2023-03-17 PROCEDURE — 84484 ASSAY OF TROPONIN QUANT: CPT | Performed by: EMERGENCY MEDICINE

## 2023-03-17 PROCEDURE — 0 POTASSIUM CHLORIDE 10 MEQ/100ML SOLUTION: Performed by: EMERGENCY MEDICINE

## 2023-03-17 PROCEDURE — 93005 ELECTROCARDIOGRAM TRACING: CPT

## 2023-03-17 PROCEDURE — 83735 ASSAY OF MAGNESIUM: CPT | Performed by: NURSE PRACTITIONER

## 2023-03-17 PROCEDURE — 70450 CT HEAD/BRAIN W/O DYE: CPT

## 2023-03-17 PROCEDURE — 80053 COMPREHEN METABOLIC PANEL: CPT | Performed by: EMERGENCY MEDICINE

## 2023-03-17 PROCEDURE — 99285 EMERGENCY DEPT VISIT HI MDM: CPT

## 2023-03-17 PROCEDURE — 25010000002 MAGNESIUM SULFATE 2 GM/50ML SOLUTION: Performed by: INTERNAL MEDICINE

## 2023-03-17 PROCEDURE — 36415 COLL VENOUS BLD VENIPUNCTURE: CPT

## 2023-03-17 PROCEDURE — 96365 THER/PROPH/DIAG IV INF INIT: CPT

## 2023-03-17 RX ORDER — MAGNESIUM SULFATE HEPTAHYDRATE 40 MG/ML
2 INJECTION, SOLUTION INTRAVENOUS
Status: COMPLETED | OUTPATIENT
Start: 2023-03-17 | End: 2023-03-18

## 2023-03-17 RX ORDER — DIAZEPAM 5 MG/1
5 TABLET ORAL ONCE
Status: COMPLETED | OUTPATIENT
Start: 2023-03-17 | End: 2023-03-17

## 2023-03-17 RX ORDER — ESCITALOPRAM OXALATE 20 MG/1
20 TABLET ORAL DAILY
Status: DISCONTINUED | OUTPATIENT
Start: 2023-03-18 | End: 2023-03-20 | Stop reason: HOSPADM

## 2023-03-17 RX ORDER — POTASSIUM CHLORIDE 7.45 MG/ML
10 INJECTION INTRAVENOUS ONCE
Status: COMPLETED | OUTPATIENT
Start: 2023-03-17 | End: 2023-03-17

## 2023-03-17 RX ORDER — SODIUM CHLORIDE, SODIUM LACTATE, POTASSIUM CHLORIDE, CALCIUM CHLORIDE 600; 310; 30; 20 MG/100ML; MG/100ML; MG/100ML; MG/100ML
75 INJECTION, SOLUTION INTRAVENOUS CONTINUOUS
Status: DISCONTINUED | OUTPATIENT
Start: 2023-03-17 | End: 2023-03-18

## 2023-03-17 RX ORDER — ACETAMINOPHEN 650 MG/1
650 SUPPOSITORY RECTAL EVERY 4 HOURS PRN
Status: DISCONTINUED | OUTPATIENT
Start: 2023-03-17 | End: 2023-03-20 | Stop reason: HOSPADM

## 2023-03-17 RX ORDER — HYDROXYZINE HYDROCHLORIDE 25 MG/1
50 TABLET, FILM COATED ORAL EVERY 6 HOURS PRN
Status: DISCONTINUED | OUTPATIENT
Start: 2023-03-17 | End: 2023-03-20 | Stop reason: HOSPADM

## 2023-03-17 RX ORDER — PANTOPRAZOLE SODIUM 40 MG/1
40 TABLET, DELAYED RELEASE ORAL DAILY
Status: DISCONTINUED | OUTPATIENT
Start: 2023-03-18 | End: 2023-03-20 | Stop reason: HOSPADM

## 2023-03-17 RX ORDER — ATENOLOL 25 MG/1
25 TABLET ORAL 2 TIMES DAILY
Status: DISCONTINUED | OUTPATIENT
Start: 2023-03-17 | End: 2023-03-20 | Stop reason: HOSPADM

## 2023-03-17 RX ORDER — MELATONIN
1000 DAILY
Status: DISCONTINUED | OUTPATIENT
Start: 2023-03-18 | End: 2023-03-20 | Stop reason: HOSPADM

## 2023-03-17 RX ORDER — SODIUM CHLORIDE 0.9 % (FLUSH) 0.9 %
10 SYRINGE (ML) INJECTION AS NEEDED
Status: DISCONTINUED | OUTPATIENT
Start: 2023-03-17 | End: 2023-03-20 | Stop reason: HOSPADM

## 2023-03-17 RX ORDER — ACETAMINOPHEN 325 MG/1
650 TABLET ORAL EVERY 4 HOURS PRN
Status: DISCONTINUED | OUTPATIENT
Start: 2023-03-17 | End: 2023-03-20 | Stop reason: HOSPADM

## 2023-03-17 RX ORDER — GINGER ROOT/GINGER ROOT EXT 262.5 MG
1 CAPSULE ORAL DAILY
Status: DISCONTINUED | OUTPATIENT
Start: 2023-03-18 | End: 2023-03-20 | Stop reason: HOSPADM

## 2023-03-17 RX ORDER — QUETIAPINE FUMARATE 25 MG/1
25 TABLET, FILM COATED ORAL EVERY 12 HOURS SCHEDULED
Status: DISCONTINUED | OUTPATIENT
Start: 2023-03-17 | End: 2023-03-20 | Stop reason: HOSPADM

## 2023-03-17 RX ORDER — ACETAMINOPHEN 160 MG/5ML
650 SOLUTION ORAL EVERY 4 HOURS PRN
Status: DISCONTINUED | OUTPATIENT
Start: 2023-03-17 | End: 2023-03-20 | Stop reason: HOSPADM

## 2023-03-17 RX ORDER — DIAZEPAM 5 MG/1
5 TABLET ORAL 2 TIMES DAILY PRN
Status: DISCONTINUED | OUTPATIENT
Start: 2023-03-18 | End: 2023-03-20 | Stop reason: HOSPADM

## 2023-03-17 RX ADMIN — POTASSIUM CHLORIDE 10 MEQ: 7.46 INJECTION, SOLUTION INTRAVENOUS at 19:04

## 2023-03-17 RX ADMIN — SODIUM CHLORIDE 1000 ML: 9 INJECTION, SOLUTION INTRAVENOUS at 15:42

## 2023-03-17 RX ADMIN — POTASSIUM CHLORIDE 10 MEQ: 7.46 INJECTION, SOLUTION INTRAVENOUS at 20:10

## 2023-03-17 RX ADMIN — HYDROXYZINE HYDROCHLORIDE 50 MG: 25 TABLET, FILM COATED ORAL at 21:02

## 2023-03-17 RX ADMIN — POTASSIUM CHLORIDE 10 MEQ: 7.46 INJECTION, SOLUTION INTRAVENOUS at 21:02

## 2023-03-17 RX ADMIN — QUETIAPINE FUMARATE 25 MG: 25 TABLET ORAL at 21:02

## 2023-03-17 RX ADMIN — SODIUM CHLORIDE, POTASSIUM CHLORIDE, SODIUM LACTATE AND CALCIUM CHLORIDE 75 ML/HR: 600; 310; 30; 20 INJECTION, SOLUTION INTRAVENOUS at 21:02

## 2023-03-17 RX ADMIN — POTASSIUM CHLORIDE 10 MEQ: 7.46 INJECTION, SOLUTION INTRAVENOUS at 16:52

## 2023-03-17 RX ADMIN — ATENOLOL 25 MG: 25 TABLET ORAL at 21:02

## 2023-03-17 RX ADMIN — DIAZEPAM 5 MG: 5 TABLET ORAL at 20:38

## 2023-03-17 RX ADMIN — MAGNESIUM SULFATE IN WATER 2 G: 40 INJECTION, SOLUTION INTRAVENOUS at 22:15

## 2023-03-17 RX ADMIN — POTASSIUM CHLORIDE 10 MEQ: 7.46 INJECTION, SOLUTION INTRAVENOUS at 17:59

## 2023-03-17 RX ADMIN — POTASSIUM CHLORIDE 10 MEQ: 7.46 INJECTION, SOLUTION INTRAVENOUS at 15:52

## 2023-03-17 NOTE — ED PROVIDER NOTES
[place]    EMERGENCY DEPARTMENT ENCOUNTER      Pt Name: Emily Grant  MRN: [id number]  YOB: 1955  Date of evaluation: 3/17/2023  Provider: Gerardo Morrison DO    CHIEF COMPLAINT       Chief Complaint   Patient presents with   • Altered Mental Status         HISTORY OF PRESENT ILLNESS  (Location/Symptom, Timing/Onset, Context/Setting, Quality, Duration, Modifying Factors, Severity.)   Emily Grant is a 68 y.o. female who presents to the emergency department for evaluation with her son at bedside provides majority of the history and notes that the patient has a previous diagnosis of bipolar disorder but is not appropriately treated as the patient does have episodes of manic phases, and severe depression which is waxing and waning in severity.  Not controlled with her medications.  He notes recently the patient has really not been acting at her baseline, has had issues with increasing depression, bipolar flares, generally weak today which prompted her evaluation to the ED.  Has a history of hypokalemia.  He notes the patient is unable to provide additional history as she has some underlying dementia, her bipolar disorder is essentially untreated.  She does not have any acute complaints.  He notes she has not been eating or drinking well, possible dehydrated.  Denies any fever, no cough or congestion, denies any other acute systemic complaints at this time.      Nursing notes were reviewed.      PAST MEDICAL HISTORY     Past Medical History:   Diagnosis Date   • Anxiety    • Bipolar disorder, current condition not specified as either manic or depressive    • COPD (chronic obstructive pulmonary disease) (HCC)    • Coronary artery disease    • Hypotension    • Tachycardia          SURGICAL HISTORY       Past Surgical History:   Procedure Laterality Date   • TOTAL HIP ARTHROPLASTY           CURRENT MEDICATIONS       Current Facility-Administered Medications:   •  [COMPLETED] potassium chloride  10 mEq in 100 mL IVPB, 10 mEq, Intravenous, Once, Stopped at 03/17/23 1652 **AND** potassium chloride 10 mEq in 100 mL IVPB, 10 mEq, Intravenous, Once, Last Rate: 100 mL/hr at 03/17/23 1652, 10 mEq at 03/17/23 1652 **AND** potassium chloride 10 mEq in 100 mL IVPB, 10 mEq, Intravenous, Once **AND** potassium chloride 10 mEq in 100 mL IVPB, 10 mEq, Intravenous, Once **AND** potassium chloride 10 mEq in 100 mL IVPB, 10 mEq, Intravenous, Once **AND** potassium chloride 10 mEq in 100 mL IVPB, 10 mEq, Intravenous, Once **AND** Potassium, , , PRN, Gerardo Morrison, DO  •  Potassium Replacement - Initiate Nurse / BPA Driven Protocol, , Does not apply, PRN, Gerardo Morrison, DO  •  sodium chloride 0.9 % flush 10 mL, 10 mL, Intravenous, PRN, Gerardo Morrison, DO    Current Outpatient Medications:   •  acetaminophen (TYLENOL) 500 MG tablet, Take 1,000 mg by mouth Every 4 (Four) Hours As Needed for Mild Pain ., Disp: , Rfl:   •  albuterol (PROVENTIL) (2.5 MG/3ML) 0.083% nebulizer solution, albuterol sulfate 2.5 mg/3 mL (0.083 %) solution for nebulization  Inhale 3 mL 3 times a day by nebulization route for 30 days., Disp: , Rfl:   •  albuterol sulfate  (90 Base) MCG/ACT inhaler, Inhale 2 puffs Every 4 (Four) Hours As Needed for Wheezing., Disp: 6.7 g, Rfl: 0  •  atenolol (TENORMIN) 25 MG tablet, Take 1 tablet by mouth 2 (Two) Times a Day., Disp: 28 tablet, Rfl: 0  •  Calcium Carbonate-Vitamin D3 600-400 MG-UNIT tablet, calcium + vitamin d3 600-10  TAKE ONE TABLET BY MOUTH EVERY DAY, Disp: , Rfl:   •  cholecalciferol (VITAMIN D3) 25 MCG (1000 UT) tablet, Take 1,000 Units by mouth Daily., Disp: , Rfl:   •  diazePAM (VALIUM) 5 MG tablet, Take 5 mg by mouth 2 (Two) Times a Day As Needed for Anxiety., Disp: , Rfl:   •  escitalopram (LEXAPRO) 20 MG tablet, escitalopram 20 mg tablet  TAKE ONE TABLET BY MOUTH ONCE DAILY, Disp: , Rfl:   •  furosemide (Lasix) 20 MG tablet, Take 1 tablet by mouth Daily., Disp: 30  tablet, Rfl: 0  •  hydrOXYzine (ATARAX) 25 MG tablet, Take 2 tablets by mouth Every 6 (Six) Hours As Needed for Itching., Disp: 30 tablet, Rfl: 0  •  meclizine (ANTIVERT) 25 MG tablet, meclizine 25 mg tablet  Take 1 tablet every day by oral route as needed for 30 days., Disp: , Rfl:   •  pantoprazole (PROTONIX) 40 MG EC tablet, Take 40 mg by mouth Daily., Disp: , Rfl:   •  potassium chloride 10 MEQ CR tablet, Take 1 tablet by mouth Daily., Disp: 30 tablet, Rfl: 0  •  QUEtiapine (SEROquel) 25 MG tablet, quetiapine 25 mg tablet  TAKE ONE (1) TABLET BY MOUTH TWICE DAILY, Disp: , Rfl:   •  QUEtiapine (SEROquel) 50 MG tablet, Take 1 tablet by mouth 2 (Two) Times a Day. (Patient taking differently: Take 25 mg by mouth 2 (Two) Times a Day.), Disp: 28 tablet, Rfl: 0  •  umeclidinium-vilanterol (Anoro Ellipta) 62.5-25 MCG/INH aerosol powder  inhaler, Anoro Ellipta 62.5 mcg-25 mcg/actuation powder for inhalation, Disp: , Rfl:   •  vitamin B-12 (CYANOCOBALAMIN) 1000 MCG tablet, Take 1,000 mcg by mouth Daily., Disp: , Rfl:     ALLERGIES     Patient has no known allergies.    FAMILY HISTORY       Family History   Problem Relation Age of Onset   • COPD Mother    • Heart failure Mother    • Heart disease Father    • Dementia Father    • Heart attack Father           SOCIAL HISTORY       Social History     Socioeconomic History   • Marital status:    Tobacco Use   • Smoking status: Every Day     Packs/day: 1.00     Years: 50.00     Pack years: 50.00     Types: Cigarettes   • Smokeless tobacco: Never   Vaping Use   • Vaping Use: Never used   Substance and Sexual Activity   • Alcohol use: Never   • Drug use: Never   • Sexual activity: Defer         PHYSICAL EXAM    (up to 7 for level 4, 8 or more for level 5)     Vitals:    03/17/23 1427 03/17/23 1500 03/17/23 1539 03/17/23 1600   BP:  168/97  156/91   BP Location:       Pulse:  102  93   Resp:       Temp:       TempSrc:       SpO2:    99%   Weight:   49.9 kg (110 lb)   "  Height: 154.9 cm (61\")          Physical Exam  General : Patient is awake,, patient does have flight of ideas, underlying bipolar disorder is present  HEENT: Pupils are equally round, EOMI, conjunctivae clear  Neck: Neck is supple  Cardiac: Heart tachycardic rate with regular rhythm  Lungs: Lungs decreased breath sound bilaterally, no acute respiratory distress  Chest wall: There is no tenderness to palpation over the chest wall or over ribs  Abdomen: Abdomen is soft, nontender, nondistended. There are no firm or pulsatile masses, no rebound rigidity or guarding  Musculoskeletal: 5 out of 5 strength in all 4 extremities.  No focal muscle deficits are appreciated  Neuro: Patient is awake, voluntarily moving all 4 extremities, is having difficulty following commands, answering questions appropriately, underlying dementia, bipolar disorder is noted.  No flaccid paralysis.  Dermatology: Skin is warm and dry        DIAGNOSTIC RESULTS     EKG:  All EKGs are interpreted by the Emergency Department Physician who either signs or Co-signs this chart in the absence of a cardiologist.    ECG 12 Lead ED Triage Standing Order; Weak / Dizzy / AMS   Final Result   Test Reason : ED Triage Standing Order~   Blood Pressure :   */*   mmHG   Vent. Rate : 113 BPM     Atrial Rate : 113 BPM      P-R Int : 152 ms          QRS Dur :  78 ms       QT Int : 358 ms       P-R-T Axes :  55  43  87 degrees      QTc Int : 491 ms      Sinus tachycardia   Nonspecific ST and T wave abnormality   Abnormal ECG   When compared with ECG of 30-JUL-2022 22:41,   Vent. rate has increased BY  37 BPM   Confirmed by GALE PETERS MD ([id number]) on 3/17/2023 3:56:38 PM      Referred By: SMITH           Confirmed By: GALE PETERS MD          RADIOLOGY:     [] Radiologist's Report Reviewed:  CT Head Without Contrast   Final Result   Motion Limited exam without definite acute intracranial abnormality      Atrophy and white matter changes compatible small vessel " ischemic disease in this age group.      Additional follow-up can be obtained as clinically indicated          Electronically Signed: Nehemias Candelario     3/17/2023 4:35 PM EDT     Workstation ID: [id number]      CT Chest Without Contrast Diagnostic   Final Result   Impression:   1.Increased size of 4.2 cm left upper lobe mass and 3.2 cm left lower lobe mass, highly concerning for primary lung malignancy. Recommend tissue sampling.   2.Increased size of mediastinal lymphadenopathy, likely due to metastatic disease.   3.Stable T10 and T12 vertebral body fractures with severe height loss and retropulsion into the spinal canal. Please see above for additional stable findings.      Electronically Signed: Paula Carreon     3/17/2023 4:49 PM EDT     Workstation ID: [id number]      XR Chest 1 View   Final Result   Impression:   1.New linear left lower lung opacity, likely due to subsegmental atelectasis.   2.Left lower lobe nodule is better visualized on comparison chest CT.      Electronically Signed: Paula Carreon     3/17/2023 2:27 PM EDT     Workstation ID: [id number]          I ordered and independently reviewed the above noted radiographic studies.      I viewed images of CT head, chest which showed no acute intracranial normality, left upper, left lobe pulmonary mass, concern for malignancy per my independent interpretation.    See radiologist's dictation for official interpretation.      ED BEDSIDE ULTRASOUND:   Performed by ED Physician - none    LABS:    I have reviewed and interpreted all of the currently available lab results from this visit (if applicable):  Results for orders placed or performed during the hospital encounter of 03/17/23   Comprehensive Metabolic Panel    Specimen: Blood   Result Value Ref Range    Glucose 125 (H) 65 - 99 mg/dL    BUN 17 8 - 23 mg/dL    Creatinine 0.75 0.57 - 1.00 mg/dL    Sodium 137 136 - 145 mmol/L    Potassium 2.9 (L) 3.5 - 5.2 mmol/L    Chloride 100 98 - 107 mmol/L    CO2 26.0  22.0 - 29.0 mmol/L    Calcium 10.0 8.6 - 10.5 mg/dL    Total Protein 6.7 6.0 - 8.5 g/dL    Albumin 3.6 3.5 - 5.2 g/dL    ALT (SGPT) 6 1 - 33 U/L    AST (SGOT) 11 1 - 32 U/L    Alkaline Phosphatase 91 39 - 117 U/L    Total Bilirubin 0.4 0.0 - 1.2 mg/dL    Globulin 3.1 gm/dL    A/G Ratio 1.2 g/dL    BUN/Creatinine Ratio 22.7 7.0 - 25.0    Anion Gap 11.0 5.0 - 15.0 mmol/L    eGFR 86.3 >60.0 mL/min/1.73   Single High Sensitivity Troponin T    Specimen: Blood   Result Value Ref Range    HS Troponin T 20 (H) <10 ng/L   Magnesium    Specimen: Blood   Result Value Ref Range    Magnesium 2.0 1.6 - 2.4 mg/dL   Urinalysis With Microscopic If Indicated (No Culture) - Urine, Clean Catch    Specimen: Urine, Clean Catch   Result Value Ref Range    Color, UA Yellow Yellow, Straw    Appearance, UA Clear Clear    pH, UA 6.5 5.0 - 8.0    Specific Gravity, UA 1.018 1.001 - 1.030    Glucose, UA Negative Negative    Ketones, UA Negative Negative    Bilirubin, UA Negative Negative    Blood, UA Negative Negative    Protein, UA Trace (A) Negative    Leuk Esterase, UA Negative Negative    Nitrite, UA Negative Negative    Urobilinogen, UA 0.2 E.U./dL 0.2 - 1.0 E.U./dL   CBC Auto Differential    Specimen: Blood   Result Value Ref Range    WBC 8.13 3.40 - 10.80 10*3/mm3    RBC 3.84 3.77 - 5.28 10*6/mm3    Hemoglobin 12.2 12.0 - 15.9 g/dL    Hematocrit 37.4 34.0 - 46.6 %    MCV 97.4 (H) 79.0 - 97.0 fL    MCH 31.8 26.6 - 33.0 pg    MCHC 32.6 31.5 - 35.7 g/dL    RDW 13.3 12.3 - 15.4 %    RDW-SD 47.8 37.0 - 54.0 fl    MPV 10.6 6.0 - 12.0 fL    Platelets 198 140 - 450 10*3/mm3    Neutrophil % 78.6 (H) 42.7 - 76.0 %    Lymphocyte % 11.1 (L) 19.6 - 45.3 %    Monocyte % 9.8 5.0 - 12.0 %    Eosinophil % 0.1 (L) 0.3 - 6.2 %    Basophil % 0.2 0.0 - 1.5 %    Immature Grans % 0.2 0.0 - 0.5 %    Neutrophils, Absolute 6.38 1.70 - 7.00 10*3/mm3    Lymphocytes, Absolute 0.90 0.70 - 3.10 10*3/mm3    Monocytes, Absolute 0.80 0.10 - 0.90 10*3/mm3    Eosinophils,  Absolute 0.01 0.00 - 0.40 10*3/mm3    Basophils, Absolute 0.02 0.00 - 0.20 10*3/mm3    Immature Grans, Absolute 0.02 0.00 - 0.05 10*3/mm3    nRBC 0.0 0.0 - 0.2 /100 WBC   ECG 12 Lead ED Triage Standing Order; Weak / Dizzy / AMS   Result Value Ref Range    QT Interval 358 ms    QTC Interval 491 ms   Green Top (Gel)   Result Value Ref Range    Extra Tube Hold for add-ons.    Lavender Top   Result Value Ref Range    Extra Tube hold for add-on    Gold Top - SST   Result Value Ref Range    Extra Tube Hold for add-ons.    Light Blue Top   Result Value Ref Range    Extra Tube Hold for add-ons.         If labs were ordered, I independently reviewed the results and considered them in treating the patient.      EMERGENCY DEPARTMENT COURSE and DIFFERENTIAL DIAGNOSIS/MDM:   Vitals:  AS OF 17:27 EDT    BP - 156/91  HR - 93  TEMP - 99.4 °F (37.4 °C) (Oral)  O2 SATS - 99%      Orders placed during this visit:  Orders Placed This Encounter   Procedures   • XR Chest 1 View   • CT Head Without Contrast   • CT Chest Without Contrast Diagnostic   • [place] Draw   • Comprehensive Metabolic Panel   • Single High Sensitivity Troponin T   • Magnesium   • Urinalysis With Microscopic If Indicated (No Culture) - Urine, Clean Catch   • CBC Auto Differential   • Potassium   • NPO Diet NPO Type: Strict NPO   • Undress & Gown   • Cardiac Monitoring   • Continuous Pulse Oximetry   • Vital Signs   • Orthostatic Blood Pressure   • Oxygen Therapy- Nasal Cannula; 2 LPM; Titrate for SPO2: 92%, Greater Than or Equal To   • POC Glucose Once   • ECG 12 Lead ED Triage Standing Order; Weak / Dizzy / AMS   • Insert Peripheral IV   • Fall Precautions   • CBC & Differential   • Green Top (Gel)   • Lavender Top   • Gold Top - SST   • Gray Top   • Light Blue Top       All labs have been independently reviewed by me.  All radiology studies have been reviewed by me and the radiologist dictating the report.  All EKG's have been independently viewed and interpreted  by me.      Discussion below represents my analysis of pertinent findings related to patient's condition, differential diagnosis, treatment plan and final disposition.    Differential diagnosis:  The differential diagnosis associated with the patient's presentation includes: Altered mental state, dementia, bipolar disorder, electrolyte normality, dehydration    Additional sources  Discussed/ obtained information from independent historians:   [] Spouse  [] Parent  [x] Family member, son  [] Friend  [] EMS   [] Other:    External (non-ED) record review:   [] Inpatient record:   [] Office record:   [] Outpatient record:   [] Prior Outpatient labs:   [] Prior Outpatient radiology:   [] Primary Care record:   [] Outside ED record:   [] Other:     Patient's care impacted by:   [] Diabetes  [] Hypertension  [] Hyperlipidemia  [] Coronary Artery Disease   [] COPD   [] Cancer   [] Tobacco Abuse   [] Substance Abuse    [x] Other: Dementia, bipolar disorder    Care significantly affected by Social Determinants of Health (housing and economic circumstances, unemployment)    [] Yes     [x] No   If yes, Patient's care significantly limited by Social Determinants of Health including:   [] Inadequate housing   [] Low income   [] Alcoholism and drug addiction in family   [] Problems related to primary support group   [] Unemployment   [] Problems related to employment   [] Other Social Determinants of Health:       MEDICATIONS ADMINISTERED IN ED:  Medications   sodium chloride 0.9 % flush 10 mL (has no administration in time range)   Potassium Replacement - Initiate Nurse / BPA Driven Protocol (has no administration in time range)   potassium chloride 10 mEq in 100 mL IVPB (0 mEq Intravenous Stopped 3/17/23 1652)     And   potassium chloride 10 mEq in 100 mL IVPB (10 mEq Intravenous New Bag 3/17/23 1652)     And   potassium chloride 10 mEq in 100 mL IVPB (has no administration in time range)     And   potassium chloride 10 mEq in  100 mL IVPB (has no administration in time range)     And   potassium chloride 10 mEq in 100 mL IVPB (has no administration in time range)     And   potassium chloride 10 mEq in 100 mL IVPB (has no administration in time range)   sodium chloride 0.9 % bolus 1,000 mL (1,000 mL Intravenous New Bag 3/17/23 1542)              68-year-old female with underlying bipolar disorder which is not under good control presents with altered state, weakness, just overall not feeling well..  She has episodes of carlotta in addition to severe depression.  Currently she is generally weak, no flaccid paralysis.  Patient is currently difficult to understand, as not able to be conversational, has flights and tangential thoughts.  We did obtain IV, labs, imaging further assessment, patient was given IV fluids, potassium 2.9, replacement protocol initiated.  No infectious etiology is appreciated on blood work, no sepsis, urinalysis is stable.  CT scan of the chest reveals a left upper, left lower lobe increasing size mass, concern for pulmonary malignancy, potentially primary lesion with lymph node involvement.  The family has somewhat of a recollection of a mass in this area but they never seek follow-up care evaluation or further treatment for this.  Patient is also having issues with her bipolar disorder treatment and therapy.  We discussed given the concern for malignancy which will likely need to be staged and evaluated, with her hypokalemia, weakness, untreated bipolar disorder she would benefit from admission to the hospital for further work-up treatment and evaluation.  Case discussed with hospitalist, Dr. Ramirez, for admission.        PROCEDURES:  Procedures    CRITICAL CARE TIME    Total Critical Care time was 35 minutes, excluding separately reportable procedures.  Altered mental status, hypokalemia requiring electrolyte replacement, new onset of altered state, left-sided pulmonary mass, likely new cancer diagnosis requiring  multiple evaluations, reassessments and discussions with consultants. There was a high probability of clinically significant/life threatening deterioration in the patient's condition which required my urgent intervention.      FINAL IMPRESSION      1. Altered mental status, unspecified altered mental status type    2. Bipolar affective disorder, current episode mixed, current episode severity unspecified (HCC)    3. Mass of left lung    4. Hypokalemia    5. Generalized weakness          DISPOSITION/PLAN     ED Disposition     ED Disposition   Decision to Admit    Condition   --    Comment   --             Comment: Please note this report has been produced using speech recognition software.      Gerardo Morrison DO  Attending Emergency Physician       Gerardo Morrison DO  03/17/23 [id number]

## 2023-03-18 ENCOUNTER — APPOINTMENT (OUTPATIENT)
Dept: CT IMAGING | Facility: HOSPITAL | Age: 68
End: 2023-03-18
Payer: MEDICARE

## 2023-03-18 LAB
ANION GAP SERPL CALCULATED.3IONS-SCNC: 8 MMOL/L (ref 5–15)
BASOPHILS # BLD AUTO: 0.02 10*3/MM3 (ref 0–0.2)
BASOPHILS NFR BLD AUTO: 0.3 % (ref 0–1.5)
BUN SERPL-MCNC: 10 MG/DL (ref 8–23)
BUN/CREAT SERPL: 27.8 (ref 7–25)
CALCIUM SPEC-SCNC: 8.5 MG/DL (ref 8.6–10.5)
CHLORIDE SERPL-SCNC: 103 MMOL/L (ref 98–107)
CO2 SERPL-SCNC: 26 MMOL/L (ref 22–29)
CREAT SERPL-MCNC: 0.36 MG/DL (ref 0.57–1)
DEPRECATED RDW RBC AUTO: 47.3 FL (ref 37–54)
EGFRCR SERPLBLD CKD-EPI 2021: 110.1 ML/MIN/1.73
EOSINOPHIL # BLD AUTO: 0.14 10*3/MM3 (ref 0–0.4)
EOSINOPHIL NFR BLD AUTO: 2.2 % (ref 0.3–6.2)
ERYTHROCYTE [DISTWIDTH] IN BLOOD BY AUTOMATED COUNT: 13.2 % (ref 12.3–15.4)
GEN 5 2HR TROPONIN T REFLEX: 25 NG/L
GLUCOSE SERPL-MCNC: 97 MG/DL (ref 65–99)
HCT VFR BLD AUTO: 32 % (ref 34–46.6)
HGB BLD-MCNC: 10.6 G/DL (ref 12–15.9)
IMM GRANULOCYTES # BLD AUTO: 0.02 10*3/MM3 (ref 0–0.05)
IMM GRANULOCYTES NFR BLD AUTO: 0.3 % (ref 0–0.5)
LYMPHOCYTES # BLD AUTO: 1.53 10*3/MM3 (ref 0.7–3.1)
LYMPHOCYTES NFR BLD AUTO: 23.8 % (ref 19.6–45.3)
MAGNESIUM SERPL-MCNC: 4 MG/DL (ref 1.6–2.4)
MCH RBC QN AUTO: 32.2 PG (ref 26.6–33)
MCHC RBC AUTO-ENTMCNC: 33.1 G/DL (ref 31.5–35.7)
MCV RBC AUTO: 97.3 FL (ref 79–97)
MONOCYTES # BLD AUTO: 0.75 10*3/MM3 (ref 0.1–0.9)
MONOCYTES NFR BLD AUTO: 11.7 % (ref 5–12)
NEUTROPHILS NFR BLD AUTO: 3.96 10*3/MM3 (ref 1.7–7)
NEUTROPHILS NFR BLD AUTO: 61.7 % (ref 42.7–76)
NRBC BLD AUTO-RTO: 0 /100 WBC (ref 0–0.2)
PLATELET # BLD AUTO: 171 10*3/MM3 (ref 140–450)
PMV BLD AUTO: 11 FL (ref 6–12)
POTASSIUM SERPL-SCNC: 3.9 MMOL/L (ref 3.5–5.2)
RBC # BLD AUTO: 3.29 10*6/MM3 (ref 3.77–5.28)
SODIUM SERPL-SCNC: 137 MMOL/L (ref 136–145)
TROPONIN T DELTA: 4 NG/L
TSH SERPL DL<=0.05 MIU/L-ACNC: 1.82 UIU/ML (ref 0.27–4.2)
WBC NRBC COR # BLD: 6.42 10*3/MM3 (ref 3.4–10.8)

## 2023-03-18 PROCEDURE — 83735 ASSAY OF MAGNESIUM: CPT | Performed by: INTERNAL MEDICINE

## 2023-03-18 PROCEDURE — 80048 BASIC METABOLIC PNL TOTAL CA: CPT | Performed by: NURSE PRACTITIONER

## 2023-03-18 PROCEDURE — 96366 THER/PROPH/DIAG IV INF ADDON: CPT

## 2023-03-18 PROCEDURE — G0378 HOSPITAL OBSERVATION PER HR: HCPCS

## 2023-03-18 PROCEDURE — 74177 CT ABD & PELVIS W/CONTRAST: CPT

## 2023-03-18 PROCEDURE — 85025 COMPLETE CBC W/AUTO DIFF WBC: CPT | Performed by: NURSE PRACTITIONER

## 2023-03-18 PROCEDURE — 84484 ASSAY OF TROPONIN QUANT: CPT | Performed by: NURSE PRACTITIONER

## 2023-03-18 PROCEDURE — 92610 EVALUATE SWALLOWING FUNCTION: CPT | Performed by: SPEECH-LANGUAGE PATHOLOGIST

## 2023-03-18 PROCEDURE — 84443 ASSAY THYROID STIM HORMONE: CPT | Performed by: NURSE PRACTITIONER

## 2023-03-18 PROCEDURE — 96361 HYDRATE IV INFUSION ADD-ON: CPT

## 2023-03-18 PROCEDURE — 25010000002 MAGNESIUM SULFATE 2 GM/50ML SOLUTION: Performed by: INTERNAL MEDICINE

## 2023-03-18 PROCEDURE — 99232 SBSQ HOSP IP/OBS MODERATE 35: CPT | Performed by: FAMILY MEDICINE

## 2023-03-18 PROCEDURE — 25510000001 IOPAMIDOL 61 % SOLUTION: Performed by: FAMILY MEDICINE

## 2023-03-18 RX ORDER — PHENOBARBITAL 32.4 MG/1
32.4 TABLET ORAL NIGHTLY
Status: DISCONTINUED | OUTPATIENT
Start: 2023-03-18 | End: 2023-03-20 | Stop reason: HOSPADM

## 2023-03-18 RX ADMIN — ATENOLOL 25 MG: 25 TABLET ORAL at 08:49

## 2023-03-18 RX ADMIN — Medication 10 ML: at 08:49

## 2023-03-18 RX ADMIN — DIAZEPAM 5 MG: 5 TABLET ORAL at 21:42

## 2023-03-18 RX ADMIN — ESCITALOPRAM OXALATE 20 MG: 20 TABLET ORAL at 08:49

## 2023-03-18 RX ADMIN — Medication 1 TABLET: at 08:52

## 2023-03-18 RX ADMIN — PANTOPRAZOLE SODIUM 40 MG: 40 TABLET, DELAYED RELEASE ORAL at 05:27

## 2023-03-18 RX ADMIN — MAGNESIUM SULFATE IN WATER 2 G: 40 INJECTION, SOLUTION INTRAVENOUS at 00:03

## 2023-03-18 RX ADMIN — QUETIAPINE FUMARATE 25 MG: 25 TABLET ORAL at 08:49

## 2023-03-18 RX ADMIN — MAGNESIUM SULFATE IN WATER 2 G: 40 INJECTION, SOLUTION INTRAVENOUS at 02:13

## 2023-03-18 RX ADMIN — IOPAMIDOL 85 ML: 612 INJECTION, SOLUTION INTRAVENOUS at 15:28

## 2023-03-18 RX ADMIN — QUETIAPINE FUMARATE 25 MG: 25 TABLET ORAL at 21:42

## 2023-03-18 RX ADMIN — ATENOLOL 25 MG: 25 TABLET ORAL at 21:42

## 2023-03-18 RX ADMIN — Medication 1000 UNITS: at 08:49

## 2023-03-18 RX ADMIN — DIAZEPAM 5 MG: 5 TABLET ORAL at 08:52

## 2023-03-18 RX ADMIN — Medication 10 ML: at 21:42

## 2023-03-18 NOTE — PROGRESS NOTES
[facility] Medicine Services  PROGRESS NOTE    Patient Name: Emily Grant  : 1955  MRN: [id number]    Date of Admission: 3/17/2023  Primary Care Physician: Jesse Reeves DO    Subjective   Subjective     CC:  Weak, lung Masses    HPI:  Patient is a 68-year-old who was admitted with weakness and inability to care for herself.  Work-up has revealed likely pulmonary malignancy with left upper and lower lobe masses .  Patient was confused and unsure what day it is.  She does not feel like she can take care of herself.  She feels like she cannot even feed herself.  She was seen by speech therapy and okay for regular diet with thin liquids.  PT and OT consults are pending.  Discussed her care with her son who is her next of kin, Landon.  He is not sure if they will pursue treatment or not.  We discussed palliative care consult he is agreeable.  She does not have a living will or designated power of .    ROS:  Gen- No fevers, chills  CV- No chest pain, palpitations  Resp- No cough, dyspnea  GI- No N/V/D, abd pain  MK-Global weakness inability to feed herself or ambulate       Objective   Objective     Vital Signs:   Temp:  [97.7 °F (36.5 °C)-99.4 °F (37.4 °C)] 97.7 °F (36.5 °C)  Heart Rate:  [] 62  Resp:  [16-25] 19  BP: (106-183)/() 110/64  Flow (L/min):  [2] 2     Physical Exam:  Constitutional: No acute distress, awake, alert; thin and frail  HENT: NCAT, mucous membranes moist  Respiratory: Clear to auscultation bilaterally, respiratory effort normal   Cardiovascular: RRR  Gastrointestinal: Positive bowel sounds, soft, nontender, nondistended  Musculoskeletal: No bilateral ankle edema; thin and frail  Psychiatric: Appropriate affect, cooperative  Neurologic: Oriented to person but not to day time or place, globally weak, Cranial Nerves grossly intact to confrontation, speech clear  Skin: No rashes      Results Reviewed:  LAB RESULTS:      Lab 23  0357  03/17/23  1353   WBC 6.42 8.13   HEMOGLOBIN 10.6* 12.2   HEMATOCRIT 32.0* 37.4   PLATELETS 171 198   NEUTROS ABS 3.96 6.38   IMMATURE GRANS (ABS) 0.02 0.02   LYMPHS ABS 1.53 0.90   MONOS ABS 0.75 0.80   EOS ABS 0.14 0.01   MCV 97.3* 97.4*         Lab 03/18/23  0357 03/17/23  2058 03/17/23  1353   SODIUM 137  --  137   POTASSIUM 3.9  --  2.9*   CHLORIDE 103  --  100   CO2 26.0  --  26.0   ANION GAP 8.0  --  11.0   BUN 10  --  17   CREATININE 0.36*  --  0.75   EGFR 110.1  --  86.3   GLUCOSE 97  --  125*   CALCIUM 8.5*  --  10.0   MAGNESIUM 4.0* 1.5* 2.0   TSH 1.820  --   --          Lab 03/17/23  1353   TOTAL PROTEIN 6.7   ALBUMIN 3.6   GLOBULIN 3.1   ALT (SGPT) 6   AST (SGOT) 11   BILIRUBIN 0.4   ALK PHOS 91         Lab 03/18/23  0019 03/17/23  2228 03/17/23  1353   HSTROP T 25* 21* 20*                 Brief Urine Lab Results  (Last result in the past 365 days)      Color   Clarity   Blood   Leuk Est   Nitrite   Protein   CREAT   Urine HCG        03/17/23 1349 Yellow   Clear   Negative   Negative   Negative   Trace                 Microbiology Results Abnormal     None          CT Head Without Contrast    Result Date: 3/17/2023  CT HEAD WO CONTRAST Date of Exam: 3/17/2023 4:21 PM EDT Indication: ams. Comparison: None available. Technique: Axial CT images were obtained of the head without contrast administration.  Reconstructed coronal and sagittal images were also obtained. Automated exposure control and iterative construction methods were used. FINDINGS: Brain/Ventricles: Mildly motion Limited imaging demonstrates no acute intracranial hemorrhage or mass effect. Mild diffuse atrophy is noted. White matter changes compatible small vessel ischemic disease in this age group noted. Orbits: Dilation of the orbits is nondiagnostic due to motion. Sinuses: Paranasal sinuses are markedly limited by motion. Mastoid air cells are grossly clear. Soft Tissues/Skull: No gross acute abnormality noted given limitations from motion      Impression: Motion Limited exam without definite acute intracranial abnormality Atrophy and white matter changes compatible small vessel ischemic disease in this age group. Additional follow-up can be obtained as clinically indicated  Electronically Signed: Nehemias Candelario  3/17/2023 4:35 PM EDT  Workstation ID: [id number]    CT Chest Without Contrast Diagnostic    Result Date: 3/17/2023  CT CHEST WO CONTRAST DIAGNOSTIC Date of Exam: 3/17/2023 4:21 PM EDT Indication: Altered mental status, hx pna. Comparison: AP chest x-ray 3/17/2023, 2 view chest x-ray 9/2/2022, CT chest 7/30/2022. Technique: Axial CT images were obtained of the chest without contrast administration.  Reconstructed coronal and sagittal images were also obtained. Automated exposure control and iterative construction methods were used. Findings: Exam is mildly limited by motion artifact. Previously seen left lower lobe irregular nodule has increased in size and now measures 3.2 x 2.7 cm on axial image 51, previously 1.8 x 1.3 cm. Irregular left upper lobe mass measures 4.2 x 3.4 cm on axial image 43, previously 5 mm. Mild-to-moderate paraseptal emphysematous changes are upper lobe predominant. There is no pleural or pericardial effusion. Heart size is not enlarged. There are coronary artery calcifications. Enlarged mediastinal lymph nodes have increased in size since previous CT. Index subcarinal lymph node measures 2.4 x 2.1 cm on axial image 45, previously 1.8 x 1.1 cm. Index AP window lymph node measures 1.9 x 1.8 seen on axial image 37, previously 1.1 x 0.8 cm. Stable 1 cm left adrenal nodule is likely a lipid rich adenoma given its Hounsfield unit measurement of 0. T10 and T12 vertebral body fractures with severe height loss and retropulsion into the spinal canal appear stable.     Impression: Impression: 1.Increased size of 4.2 cm left upper lobe mass and 3.2 cm left lower lobe mass, highly concerning for primary lung malignancy. Recommend  tissue sampling. 2.Increased size of mediastinal lymphadenopathy, likely due to metastatic disease. 3.Stable T10 and T12 vertebral body fractures with severe height loss and retropulsion into the spinal canal. Please see above for additional stable findings. Electronically Signed: Paula Carreon  3/17/2023 4:49 PM EDT  Workstation ID: [id number]    XR Chest 1 View    Result Date: 3/17/2023  XR CHEST 1 VW Date of Exam: 3/17/2023 1:55 PM EDT Indication: Weak/Dizzy/AMS triage protocol. Comparison: AP chest x-ray 9/2/2022, CT chest 7/30/2022 Findings: Left lower lobe nodule is better visualized on chest CT. There is a new linear opacity in the left lower lung. Lung appears clear. No pneumothorax or large pleural effusion is seen. Heart size is not enlarged. There are arthritic changes of both shoulders.     Impression: Impression: 1.New linear left lower lung opacity, likely due to subsegmental atelectasis. 2.Left lower lobe nodule is better visualized on comparison chest CT. Electronically Signed: Paula Carreon  3/17/2023 2:27 PM EDT  Workstation ID: [id number]          Current medications:  Scheduled Meds:atenolol, 25 mg, Oral, BID  Calcium Carb-Cholecalciferol, 1 tablet, Oral, Daily  cholecalciferol, 1,000 Units, Oral, Daily  escitalopram, 20 mg, Oral, Daily  pantoprazole, 40 mg, Oral, Daily  QUEtiapine, 25 mg, Oral, Q12H      Continuous Infusions:   PRN Meds:.•  acetaminophen **OR** acetaminophen **OR** acetaminophen  •  Calcium Replacement - Initiate Nurse / BPA Driven Protocol  •  diazePAM  •  hydrOXYzine  •  Magnesium Standard Dose Replacement - Initiate Nurse / BPA Driven Protocol  •  Phosphorus Replacement - Initiate Nurse / BPA Driven Protocol  •  Potassium Replacement - Initiate Nurse / BPA Driven Protocol  •  sodium chloride    Assessment & Plan   Assessment & Plan     Active Hospital Problems    Diagnosis  POA   • **Lung mass, suspected malignancy 3/17/2023 [R91.8]  Yes   • Coronary artery disease [I25.10]   Unknown   • Ataxia [R27.0]  Yes   • Failure to thrive in adult [R62.7]  Yes   • Weakness [R53.1]  Yes   • Altered mental status, unspecified altered mental status type [R41.82]  Yes   • Bipolar disorder, current episode mixed, moderate (HCC) [F31.62]  Yes   • Tobacco use [Z72.0]  Yes   • Hypokalemia [E87.6]  Yes   • Elevated troponin [R77.8]  Yes   • Anxiety disorder [F41.9]  Yes   • COPD (chronic obstructive pulmonary disease) (HCC) [J44.9]  Yes   • HTN (hypertension) [I10]  Yes   • DDD (degenerative disc disease), thoracic [M51.34]  Yes      Resolved Hospital Problems   No resolved problems to display.        Brief Hospital Course to date:  Emily Grant is a 68 y.o. female with past medical history of bipolar disorder, anxiety, degenerative disc disease, COPD, hypertension, lung mass, tobacco abuse, and CAD who presented to the hospital with failure to thrive, altered mental status/confusion, global weakness and inability to care for herself.  She was found to have new left upper and lower lobe masses suspicious for primary malignancy.    Left upper and lower lobe lung masses, suspicious for malignancy  COPD  Former tobacco abuse  -Request palliative care consult 3/18/2023  - Patient unsure if she would pursue treatment, also discussed with son    Failure to thrive in adult  Altered mental status/confusion  Global weakness  Ataxia  -PT and OT consults  -Will likely need placement  -Consulted case management  -Fall precautions    Bipolar disorder  - Requested telemedicine psych visit  - Continue Valium and Seroquel  -Continue Lexapro    Hypokalemia  -Replace per protocol    History of hypertension  History of CAD  Elevated high-sensitivity troponin  -Continue atenolol  -EKG with no acute ST changes  -Clinically no chest pain or shortness of breath at rest    Degenerative disc disease  -Stable T10 and T12 vertebral body fractures with height loss    Expected Discharge Location and Transportation: Rehab versus  home  Expected Discharge pending discussion with palliative care regarding likely lung cancer  Expected Discharge Date and Time     Expected Discharge Date Expected Discharge Time    Mar 21, 2023            DVT prophylaxis:  Mechanical DVT prophylaxis orders are present.          CODE STATUS:   Code Status and Medical Interventions:   Ordered at: 03/17/23 2047     Level Of Support Discussed With:    Patient     Code Status (Patient has no pulse and is not breathing):    CPR (Attempt to Resuscitate)     Medical Interventions (Patient has pulse or is breathing):    Full Support       Marce Leal MD  03/18/23

## 2023-03-18 NOTE — PLAN OF CARE
Problem: Palliative Care  Goal: Enhanced Quality of Life  Outcome: Ongoing, Progressing  Intervention: Promote Advance Care Planning  Flowsheets (Taken 3/18/2023 1355)  Life Transition/Adjustment:   palliative care initiated   decision-making facilitated   palliative care discussed  Intervention: Maximize Comfort  Flowsheets  Taken 3/18/2023 1355  Pain Management Interventions:   care clustered   position adjusted   pillow support provided   quiet environment facilitated  Taken 3/18/2023 1315  Pain Management Interventions:   care clustered   pillow support provided   position adjusted   quiet environment facilitated  Intervention: Optimize Function  Flowsheets (Taken 3/18/2023 1355)  Sensory Stimulation Regulation:   quiet environment promoted   care clustered  Sleep/Rest Enhancement:   awakenings minimized   family presence promoted   regular sleep/rest pattern promoted  Intervention: Optimize Psychosocial Wellbeing  Flowsheets (Taken 3/18/2023 1355)  Supportive Measures:   active listening utilized   decision-making supported   verbalization of feelings encouraged   positive reinforcement provided  Family/Support System Care:   caregiver stress acknowledged   support provided   Goal Outcome Evaluation:      Patient was seen at bedside son was present in the room.  Patient denied pain, dyspnea, N/V at this time.  Ask patient about feelings of depression and anxiety, she denied any depression at this time and stated her anxiety was better now that she is taking her medication again.  Answered question from her and her son.  Son voiced trying to get her to do any follow up tests and things that could be done inpatient as he fears if she is discharged she would not follow up with any of it again.  Palliative will follow for symptom management, GOC, and support.

## 2023-03-18 NOTE — THERAPY EVALUATION
Acute Care - Speech Language Pathology   Swallow Initial Evaluation [facility]   Clinical Swallow Evaluation       Patient Name: Emily Grant  : 1955  MRN: [id number]  Today's Date: 3/18/2023               Admit Date: 3/17/2023    Visit Dx:     ICD-10-CM ICD-9-CM   1. Altered mental status, unspecified altered mental status type  R41.82 780.97   2. Bipolar affective disorder, current episode mixed, current episode severity unspecified ([facility])  F31.60 296.60   3. Mass of left lung  R91.8 786.6   4. Hypokalemia  E87.6 276.8   5. Generalized weakness  R53.1 780.79     Patient Active Problem List   Diagnosis   • 1.8 x 1.3 cm left lower lobe noncalcified nodule (2022)   • Compression fracture of body of thoracic vertebra ([facility])   • COPD (chronic obstructive pulmonary disease) ([facility])   • HTN (hypertension)   • Anxiety disorder   • DDD (degenerative disc disease), thoracic   • Altered mental status, unspecified altered mental status type   • Bipolar disorder, current episode mixed, moderate ([facility])   • Tobacco use   • Hypokalemia   • Elevated troponin   • Lung mass     Past Medical History:   Diagnosis Date   • Anxiety    • Bipolar disorder, current condition not specified as either manic or depressive    • COPD (chronic obstructive pulmonary disease) ([facility])    • Coronary artery disease    • Hypotension    • Tachycardia      Past Surgical History:   Procedure Laterality Date   • TOTAL HIP ARTHROPLASTY         SLP Recommendation and Plan  SLP Swallowing Diagnosis: R/O pharyngeal dysphagia (23 1001)  SLP Diet Recommendation: regular textures, thin liquids (23 1001)  Recommended Precautions and Strategies: upright posture during/after eating, general aspiration precautions (23 1001)  SLP Rec. for Method of Medication Administration: meds whole, with thin liquids, with puree, meds via alternate route (23 1001)     Monitor for Signs of Aspiration: yes, notify SLP if any concerns (23  1001)  Recommended Diagnostics: other (see comments) (diet tolerance) (03/18/23 1001)  Swallow Criteria for Skilled Therapeutic Interventions Met: demonstrates skilled criteria (03/18/23 1001)  Anticipated Discharge Disposition (SLP): anticipate therapy at next level of care (03/18/23 1001)  Rehab Potential/Prognosis, Swallowing: good, to achieve stated therapy goals (03/18/23 1001)  Therapy Frequency (Swallow): PRN (03/18/23 1001)  Predicted Duration Therapy Intervention (Days): until discharge (03/18/23 1001)          SWALLOW EVALUATION (last 72 hours)     SLP Adult Swallow Evaluation     Row Name 03/18/23 1001       Rehab Evaluation    Document Type evaluation  -CJ    Subjective Information no complaints  -CJ    Patient Observations alert;cooperative  -CJ    Patient/Family/Caregiver Comments/Observations no family present  -CJ    Patient Effort good  -CJ    Symptoms Noted During/After Treatment none  -CJ       General Information    Patient Profile Reviewed yes  -CJ    Pertinent History Of Current Problem Pt adm w/ ams; sig h/o COPD, HTN, anxiety, bipolar disorder. CXR: KATHIE mass, LLL mass  -CJ    Current Method of Nutrition regular textures;thin liquids  -CJ    Precautions/Limitations, Vision WFL;for purposes of eval  -CJ    Precautions/Limitations, Hearing WFL;for purposes of eval  -CJ    Prior Level of Function-Communication unknown  -CJ    Prior Level of Function-Swallowing no diet consistency restrictions  -CJ    Plans/Goals Discussed with patient  -CJ    Barriers to Rehab none identified  -CJ    Patient's Goals for Discharge patient did not state  -CJ       Pain    Additional Documentation Pain Scale: FACES Pre/Post-Treatment (Group)  -CJ       Pain Scale: FACES Pre/Post-Treatment    Pain: FACES Scale, Pretreatment 0-->no hurt  -CJ    Posttreatment Pain Rating 0-->no hurt  -CJ       Oral Motor Structure and Function    Dentition Assessment natural, present and adequate  -CJ    Secretion Management WNL/WFL   -    Mucosal Quality moist, healthy  -       Oral Musculature and Cranial Nerve Assessment    Oral Motor General Assessment WFL  -       General Eating/Swallowing Observations    Respiratory Support Currently in Use nasal cannula  -    O2 Liters 2L  -    Eating/Swallowing Skills self-fed  -    Positioning During Eating upright in bed  -    Utensils Used spoon;cup;straw  -    Consistencies Trialed regular textures;ice chips;thin liquids;pureed  -       Clinical Swallow Eval    Pharyngeal Phase --  no suspected impairment  -    Clinical Swallow Evaluation Summary Oral phase is seemingly wfl. Adequate mastication w/ solids. No oral residue. Throat clear w/ initial teaspoon presentation of thin liquids. No other overt s/s of aspiration. No other clinical s/s concerning for silent aspiration even when pushed w/ consecutive sips. Will continue regular diet w/ thin liquids and f/u for diet tolerance  -       SLP Evaluation Clinical Impression    SLP Swallowing Diagnosis R/O pharyngeal dysphagia  -    Functional Impact risk of aspiration/pneumonia  -    Rehab Potential/Prognosis, Swallowing good, to achieve stated therapy goals  -    Swallow Criteria for Skilled Therapeutic Interventions Met demonstrates skilled criteria  -       Recommendations    Therapy Frequency (Swallow) PRN  -    Predicted Duration Therapy Intervention (Days) until discharge  -    SLP Diet Recommendation regular textures;thin liquids  -    Recommended Diagnostics other (see comments)  diet tolerance  -    Recommended Precautions and Strategies upright posture during/after eating;general aspiration precautions  -    Oral Care Recommendations Oral Care BID/PRN  -    SLP Rec. for Method of Medication Administration meds whole;with thin liquids;with puree;meds via alternate route  -    Monitor for Signs of Aspiration yes;notify SLP if any concerns  -    Anticipated Discharge Disposition (SLP) anticipate  therapy at next level of care  -CJ          User Key  (r) = Recorded By, (t) = Taken By, (c) = Cosigned By    Initials Name Effective Dates    Ronda Ba MS CCC-SLP 06/16/21 -                 EDUCATION  The patient has been educated in the following areas:   Dysphagia (Swallowing Impairment) Oral Care/Hydration.        SLP GOALS     Row Name 03/18/23 1001             (LTG) Patient will demonstrate functional swallow for    Diet Texture (Demonstrate functional swallow) regular textures  -CJ      Liquid viscosity (Demonstrate functional swallow) thin liquids  -CJ      [place] (Demonstrate functional swallow) independently (over 90% accuracy)  -CJ      Time Frame (Demonstrate functional swallow) by discharge  -CJ      Progress/Outcomes (Demonstrate functional swallow) new goal  -CJ         (STG) Patient will tolerate trials of    Consistencies Trialed (Tolerate trials) regular textures;thin liquids  -CJ      Desired Outcome (Tolerate trials) without signs/symptoms of aspiration  -CJ      [place] (Tolerate trials) independently (over 90% accuracy)  -CJ      Time Frame (Tolerate trials) 1 week  -CJ      Progress/Outcomes (Tolerate trials) new goal  -CJ            User Key  (r) = Recorded By, (t) = Taken By, (c) = Cosigned By    Initials Name Provider Type    Ronda Ba MS CCC-SLP Speech and Language Pathologist                   Time Calculation:    Time Calculation- SLP     Row Name 03/18/23 1136             Time Calculation- SLP    SLP Start Time 1001  -      SLP Received On 03/18/23  -         Untimed Charges    24450-DF Eval Oral Pharyng Swallow Minutes 40  -CJ         Total Minutes    Untimed Charges Total Minutes 40  -CJ       Total Minutes 40  -CJ            User Key  (r) = Recorded By, (t) = Taken By, (c) = Cosigned By    Initials Name Provider Type    Ronda Ba MS CCC-SLP Speech and Language Pathologist                Therapy Charges for Today     Code Description  Service Date Service Provider Modifiers Qty    [id number]  ST EVAL ORAL PHARYNG SWALLOW 3 3/18/2023 Ronda Singh, MS CCC-SLP GN 1               Ronda Singh, MS CCC-SLP  3/18/2023

## 2023-03-18 NOTE — PLAN OF CARE
Goal Outcome Evaluation:              Outcome Evaluation: A&Ox1-2. VSS on 2L HS, RA while awake. Sinus [place] on monitor. Slept most of the shift. No other complaints at this time.
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
## 2023-03-19 PROCEDURE — 97165 OT EVAL LOW COMPLEX 30 MIN: CPT

## 2023-03-19 PROCEDURE — 99232 SBSQ HOSP IP/OBS MODERATE 35: CPT | Performed by: NURSE PRACTITIONER

## 2023-03-19 PROCEDURE — G0378 HOSPITAL OBSERVATION PER HR: HCPCS

## 2023-03-19 PROCEDURE — 97161 PT EVAL LOW COMPLEX 20 MIN: CPT

## 2023-03-19 RX ADMIN — ATENOLOL 25 MG: 25 TABLET ORAL at 08:35

## 2023-03-19 RX ADMIN — PANTOPRAZOLE SODIUM 40 MG: 40 TABLET, DELAYED RELEASE ORAL at 08:34

## 2023-03-19 RX ADMIN — Medication 10 ML: at 08:35

## 2023-03-19 RX ADMIN — QUETIAPINE FUMARATE 25 MG: 25 TABLET ORAL at 08:35

## 2023-03-19 RX ADMIN — QUETIAPINE FUMARATE 25 MG: 25 TABLET ORAL at 21:09

## 2023-03-19 RX ADMIN — Medication 10 ML: at 21:09

## 2023-03-19 RX ADMIN — Medication 1000 UNITS: at 08:36

## 2023-03-19 RX ADMIN — PHENOBARBITAL 32.4 MG: 32.4 TABLET ORAL at 21:09

## 2023-03-19 RX ADMIN — ESCITALOPRAM OXALATE 20 MG: 20 TABLET ORAL at 08:34

## 2023-03-19 RX ADMIN — Medication 1 TABLET: at 08:34

## 2023-03-19 RX ADMIN — ATENOLOL 25 MG: 25 TABLET ORAL at 21:09

## 2023-03-19 RX ADMIN — DIAZEPAM 5 MG: 5 TABLET ORAL at 21:18

## 2023-03-19 NOTE — PROGRESS NOTES
"    [facility] Medicine Services  PROGRESS NOTE    Patient Name: Emily Grant  : 1955  MRN: [id number]    Date of Admission: 3/17/2023  Primary Care Physician: Jesse Reeves DO    Subjective   Subjective     CC:  Weak, lung Masses    HPI:  Patient sitting up in bed in NAD.  She was up in the chair earlier eating breakfast in NAD.  Patient has no complaints and is alert today and answering questions appropriately without any hesitation.  OT saw patient this morning and signed off stating she was able to safely do all ADLs and related transfers.  They recommend home with continued assist as needed.  PT saw patient and states she walker and HH at discharge.  And able to move independently, PT feels skilled PT is warranted.    ROS:  Gen- No fevers, chills; generalized weakness  CV- No chest pain, palpitations  Resp- No cough, dyspnea  GI- No N/V/D, abd pain  All other systems have been reviewed and the pertinent positives and negatives are listed above in the HPI or ROS       Objective   Objective     Vital Signs:   Temp:  [97.7 °F (36.5 °C)-98 °F (36.7 °C)] 98 °F (36.7 °C)  Heart Rate:  [51-64] 64  Resp:  [17-19] 18  BP: (110-184)/(61-91) 184/91     Physical Exam:  Constitutional: Alert, chronically ill-appearing older female sitting up in bed in NAD  Eyes: EOMI, sclerae anicteric, no conjunctival injection  Head: NCAT  ENT: [place], moist mucous membranes   Respiratory: Nonlabored, symmetrical chest expansion, crackles in bases bilaterally, 93% RA  Cardiovascular: RRR, HR 62, no M/R/G, +DP pulses bilaterally  Gastrointestinal: Soft, NT, ND +BS  Musculoskeletal: TAYLOR; no LE edema bilaterally  Neurologic: Oriented to person, \"BHL\", month and year, but unsure why she was admitted, strength symmetric in all extremities with generalized weakness, follows all commands, speech clear  Skin: No rashes on exposed skin  Psychiatric: Pleasant and cooperative; flat affect    Results Reviewed:  LAB " RESULTS:      Lab 03/18/23  0357 03/17/23  1353   WBC 6.42 8.13   HEMOGLOBIN 10.6* 12.2   HEMATOCRIT 32.0* 37.4   PLATELETS 171 198   NEUTROS ABS 3.96 6.38   IMMATURE GRANS (ABS) 0.02 0.02   LYMPHS ABS 1.53 0.90   MONOS ABS 0.75 0.80   EOS ABS 0.14 0.01   MCV 97.3* 97.4*         Lab 03/18/23  0357 03/17/23 2058 03/17/23  1353   SODIUM 137  --  137   POTASSIUM 3.9  --  2.9*   CHLORIDE 103  --  100   CO2 26.0  --  26.0   ANION GAP 8.0  --  11.0   BUN 10  --  17   CREATININE 0.36*  --  0.75   EGFR 110.1  --  86.3   GLUCOSE 97  --  125*   CALCIUM 8.5*  --  10.0   MAGNESIUM 4.0* 1.5* 2.0   TSH 1.820  --   --          Lab 03/17/23  1353   TOTAL PROTEIN 6.7   ALBUMIN 3.6   GLOBULIN 3.1   ALT (SGPT) 6   AST (SGOT) 11   BILIRUBIN 0.4   ALK PHOS 91         Lab 03/18/23  0019 03/17/23 2228 03/17/23  1353   HSTROP T 25* 21* 20*                 Brief Urine Lab Results  (Last result in the past 365 days)      Color   Clarity   Blood   Leuk Est   Nitrite   Protein   CREAT   Urine HCG        03/17/23 1349 Yellow   Clear   Negative   Negative   Negative   Trace                 Microbiology Results Abnormal     None          CT Head Without Contrast    Result Date: 3/17/2023  CT HEAD WO CONTRAST Date of Exam: 3/17/2023 4:21 PM EDT Indication: ams. Comparison: None available. Technique: Axial CT images were obtained of the head without contrast administration.  Reconstructed coronal and sagittal images were also obtained. Automated exposure control and iterative construction methods were used. FINDINGS: Brain/Ventricles: Mildly motion Limited imaging demonstrates no acute intracranial hemorrhage or mass effect. Mild diffuse atrophy is noted. White matter changes compatible small vessel ischemic disease in this age group noted. Orbits: Dilation of the orbits is nondiagnostic due to motion. Sinuses: Paranasal sinuses are markedly limited by motion. Mastoid air cells are grossly clear. Soft Tissues/Skull: No gross acute abnormality  noted given limitations from motion     Impression: Motion Limited exam without definite acute intracranial abnormality Atrophy and white matter changes compatible small vessel ischemic disease in this age group. Additional follow-up can be obtained as clinically indicated  Electronically Signed: Nehemias Candelario  3/17/2023 4:35 PM EDT  Workstation ID: [id number]    CT Chest Without Contrast Diagnostic    Result Date: 3/17/2023  CT CHEST WO CONTRAST DIAGNOSTIC Date of Exam: 3/17/2023 4:21 PM EDT Indication: Altered mental status, hx pna. Comparison: AP chest x-ray 3/17/2023, 2 view chest x-ray 9/2/2022, CT chest 7/30/2022. Technique: Axial CT images were obtained of the chest without contrast administration.  Reconstructed coronal and sagittal images were also obtained. Automated exposure control and iterative construction methods were used. Findings: Exam is mildly limited by motion artifact. Previously seen left lower lobe irregular nodule has increased in size and now measures 3.2 x 2.7 cm on axial image 51, previously 1.8 x 1.3 cm. Irregular left upper lobe mass measures 4.2 x 3.4 cm on axial image 43, previously 5 mm. Mild-to-moderate paraseptal emphysematous changes are upper lobe predominant. There is no pleural or pericardial effusion. Heart size is not enlarged. There are coronary artery calcifications. Enlarged mediastinal lymph nodes have increased in size since previous CT. Index subcarinal lymph node measures 2.4 x 2.1 cm on axial image 45, previously 1.8 x 1.1 cm. Index AP window lymph node measures 1.9 x 1.8 seen on axial image 37, previously 1.1 x 0.8 cm. Stable 1 cm left adrenal nodule is likely a lipid rich adenoma given its Hounsfield unit measurement of 0. T10 and T12 vertebral body fractures with severe height loss and retropulsion into the spinal canal appear stable.     Impression: Impression: 1.Increased size of 4.2 cm left upper lobe mass and 3.2 cm left lower lobe mass, highly concerning for  primary lung malignancy. Recommend tissue sampling. 2.Increased size of mediastinal lymphadenopathy, likely due to metastatic disease. 3.Stable T10 and T12 vertebral body fractures with severe height loss and retropulsion into the spinal canal. Please see above for additional stable findings. Electronically Signed: Paula Carreon  3/17/2023 4:49 PM EDT  Workstation ID: [id number]    CT Abdomen Pelvis With Contrast    Result Date: 3/18/2023  CT ABDOMEN PELVIS W CONTRAST Date of Exam: 3/18/2023 3:26 PM EDT Indication: lung mass, ? mets. Comparison: CT chest from March 17, 2023 and lumbar spine radiographs from January 19, 2016 Technique: Axial CT images were obtained of the abdomen and pelvis following the uneventful intravenous administration of 85 mL Isovue-300. Reconstructed coronal and sagittal images were also obtained. Automated exposure control and iterative construction methods were used. Findings: Within the lung bases is bibasilar atelectasis and trace bilateral pleural effusions. There is focal hepatic steatosis near the falciform ligament. The gallbladder, adrenal glands, kidneys, spleen, and pancreas are unremarkable. The stomach appears normal. The small bowel appears normal in caliber and configuration. The colon appears normal. The appendix appears normal. There is no ascites or loculated collection. No abnormally enlarged lymph nodes are identified. There is partially calcified plaque in the abdominal aorta. The pelvis is partially excluded by streak artifact from a right hip arthroplasty. Within this constraint, the rectum, uterus, and urinary bladder are grossly unremarkable. No aggressive osseous lesions are identified. There is a stable severe compression deformity at T12 with bony retropulsion. There is a mild superior endplate compression deformity at L2 with an associated fracture line and sclerosis, new from prior radiographs from 2016. There is a mild chronic appearing inferior endplate  compression deformity at L4.     Impression: Impression: 1.No evidence of malignancy or metastasis within abdomen/pelvis. 2.Bibasilar atelectasis with trace bilateral pleural effusions. 3.Mild superior endplate compression deformity at L2 with associated fracture line and sclerosis, new from prior radiographs from 2016. This could be an acute or subacute fracture. Correlate with point tenderness and history of recent trauma. Electronically Signed: Rajiv Hughes  3/18/2023 6:54 PM EDT  Workstation ID: [id number]    XR Chest 1 View    Result Date: 3/17/2023  XR CHEST 1 VW Date of Exam: 3/17/2023 1:55 PM EDT Indication: Weak/Dizzy/AMS triage protocol. Comparison: AP chest x-ray 9/2/2022, CT chest 7/30/2022 Findings: Left lower lobe nodule is better visualized on chest CT. There is a new linear opacity in the left lower lung. Lung appears clear. No pneumothorax or large pleural effusion is seen. Heart size is not enlarged. There are arthritic changes of both shoulders.     Impression: Impression: 1.New linear left lower lung opacity, likely due to subsegmental atelectasis. 2.Left lower lobe nodule is better visualized on comparison chest CT. Electronically Signed: Paula Carreon  3/17/2023 2:27 PM EDT  Workstation ID: [id number]          Current medications:  Scheduled Meds:atenolol, 25 mg, Oral, BID  Calcium Carb-Cholecalciferol, 1 tablet, Oral, Daily  cholecalciferol, 1,000 Units, Oral, Daily  escitalopram, 20 mg, Oral, Daily  pantoprazole, 40 mg, Oral, Daily  PHENobarbital, 32.4 mg, Oral, Nightly  QUEtiapine, 25 mg, Oral, Q12H      Continuous Infusions:   PRN Meds:.•  acetaminophen **OR** acetaminophen **OR** acetaminophen  •  Calcium Replacement - Initiate Nurse / BPA Driven Protocol  •  diazePAM  •  hydrOXYzine  •  Magnesium Standard Dose Replacement - Initiate Nurse / BPA Driven Protocol  •  Phosphorus Replacement - Initiate Nurse / BPA Driven Protocol  •  Potassium Replacement - Initiate Nurse / BPA Driven  Protocol  •  sodium chloride    Assessment & Plan   Assessment & Plan     Active Hospital Problems    Diagnosis  POA   • **Lung mass, suspected malignancy 3/17/2023 [R91.8]  Yes   • Coronary artery disease [I25.10]  Unknown   • Ataxia [R27.0]  Yes   • Failure to thrive in adult [R62.7]  Yes   • Weakness [R53.1]  Yes   • Altered mental status, unspecified altered mental status type [R41.82]  Yes   • Bipolar disorder, current episode mixed, moderate (HCC) [F31.62]  Yes   • Tobacco use [Z72.0]  Yes   • Hypokalemia [E87.6]  Yes   • Elevated troponin [R77.8]  Yes   • Anxiety disorder [F41.9]  Yes   • COPD (chronic obstructive pulmonary disease) (HCC) [J44.9]  Yes   • HTN (hypertension) [I10]  Yes   • DDD (degenerative disc disease), thoracic [M51.34]  Yes      Resolved Hospital Problems   No resolved problems to display.        Brief Hospital Course to date:  Emily Grant is a 68 y.o. female with past medical history of bipolar disorder, anxiety, degenerative disc disease, COPD, hypertension, lung mass, tobacco abuse, and CAD who presented to the hospital with failure to thrive, altered mental status/confusion, global weakness and inability to care for herself.  She was found to have new left upper and lower lobe masses suspicious for primary malignancy.    These problems are new to me today    This patient's problems and plans were partially entered by my partner and updated as appropriate by me 03/19/23.    Left upper and lower lobe lung masses, suspicious for malignancy  COPD  Former tobacco abuse  -Request palliative care consult 3/18/2023  -Patient unsure if she would pursue treatment, also discussed with son    Failure to thrive in adult  Altered mental status/confusion  Global weakness  Ataxia  -PT and OT consulted and OT has signed off stating that patient is fine with her ADLs, but then when he asked her to lift her arms and things she acted like she could not do it.  PT recommends home with walker and HH  versus skilled IP if she wants to mobilize independently  -Will likely need placement  -Case management consulted  -Fall precautions    Bipolar disorder  - Requested telemedicine psych visit-pending  - Continue Valium and Seroquel  -Continue Lexapro    Hypokalemia  -Replace per protocol    History of hypertension  History of CAD  Elevated high-sensitivity troponin  -Continue atenolol  -EKG with no acute ST changes  -Clinically no chest pain or shortness of breath at rest    Degenerative disc disease  -Stable T10 and T12 vertebral body fractures with height loss    Expected Discharge Location and Transportation: Rehab versus home  Expected Discharge pending discussion with palliative care regarding likely lung cancer  Expected Discharge Date and Time     Expected Discharge Date Expected Discharge Time    Mar 21, 2023            DVT prophylaxis:  Mechanical DVT prophylaxis orders are present.          CODE STATUS:   Code Status and Medical Interventions:   Ordered at: 03/17/23 2047     Level Of Support Discussed With:    Patient     Code Status (Patient has no pulse and is not breathing):    CPR (Attempt to Resuscitate)     Medical Interventions (Patient has pulse or is breathing):    Full Support       Caryn Gaitan, GUNJAN  03/19/23

## 2023-03-19 NOTE — PLAN OF CARE
Problem: Adult Inpatient Plan of Care  Goal: Plan of Care Review  Recent Flowsheet Documentation  Taken 3/19/2023 0839 by Renzo Brunson, OT  Progress: no change  Plan of Care Reviewed With: patient  Outcome Evaluation: Pt presents at baseline for ADL completion w/ symmetrical BUE strength and coordination/sensation intact. Demonstrates functional strength WFL for safe ADLs and related transfers. OT signing off, defer to PT for any mobility needs. Rec d/c to home w/ continued assist when needed.

## 2023-03-19 NOTE — PLAN OF CARE
Goal Outcome Evaluation:  Plan of Care Reviewed With: patient           Outcome Evaluation: PT initial Eval completed.  Pt presents with generalized weakness, decreased functional endurance, mild balance impairment and gait instability, and decreased indep with functional mobility compared to baseline level of function.  Able to ambulate household distance with CGA and no AD.  Pt warrants skilled IP PT to improve indep with mobility and return to PLOF.  Rec HWA and HH PT upon d/c.

## 2023-03-19 NOTE — NURSING NOTE
Pt refused to take any new medications overnight- stated that she needed a MD to explain any medications and how they work prior to her taking it (specifically phenobarbital). RN educated pt- pt continued to refused medication.

## 2023-03-19 NOTE — THERAPY DISCHARGE NOTE
Acute Care - Occupational Therapy Discharge  [facility]    Patient Name: Emily Grant  : 1955    MRN: [id number]                              Today's Date: 3/19/2023       Admit Date: 3/17/2023    Visit Dx:     ICD-10-CM ICD-9-CM   1. Altered mental status, unspecified altered mental status type  R41.82 780.97   2. Bipolar affective disorder, current episode mixed, current episode severity unspecified ([facility])  F31.60 296.60   3. Mass of left lung  R91.8 786.6   4. Hypokalemia  E87.6 276.8   5. Generalized weakness  R53.1 780.79     Patient Active Problem List   Diagnosis   • 1.8 x 1.3 cm left lower lobe noncalcified nodule (2022)   • Compression fracture of body of thoracic vertebra ([facility])   • COPD (chronic obstructive pulmonary disease) ([facility])   • HTN (hypertension)   • Anxiety disorder   • DDD (degenerative disc disease), thoracic   • Altered mental status, unspecified altered mental status type   • Bipolar disorder, current episode mixed, moderate ([facility])   • Tobacco use   • Hypokalemia   • Elevated troponin   • Lung mass, suspected malignancy 3/17/2023   • Coronary artery disease   • Ataxia   • Failure to thrive in adult   • Weakness     Past Medical History:   Diagnosis Date   • Anxiety    • Bipolar disorder, current condition not specified as either manic or depressive    • COPD (chronic obstructive pulmonary disease) ([facility])    • Coronary artery disease    • Hypotension    • Tachycardia      Past Surgical History:   Procedure Laterality Date   • TOTAL HIP ARTHROPLASTY        General Information     Row Name 23 0832          OT Time and Intention    Document Type discharge evaluation/summary  -CS     Row Name 23 0832          General Information    Patient Profile Reviewed yes  -CS     Prior Level of Function independent:;all household mobility;ADL's;driving  Pt reports owning RW and SPC, uses prn. Son lives 3 miles away and provides assist for IADLs when needed. Has car, seldomly drives.  Shower chair in place for seated bathing tasks.  -CS     Existing Precautions/Restrictions fall  -CS     Barriers to Rehab medically complex  -CS     Row Name 03/19/23 0832          Living Environment    People in Home alone  -CS     Row Name 03/19/23 0832          Home Main Entrance    Number of Stairs, Main Entrance none  -CS     Row Name 03/19/23 0832          Stairs Within Home, Primary    Number of Stairs, Within Home, Primary none  -CS     Row Name 03/19/23 0832          Cognition    Orientation Status (Cognition) oriented x 3  -CS     Row Name 03/19/23 0832          Safety Issues, Functional Mobility    Safety Issues Affecting Function (Mobility) safety precaution awareness;safety precautions follow-through/compliance  -CS     Impairments Affecting Function (Mobility) strength  -CS           User Key  (r) = Recorded By, (t) = Taken By, (c) = Cosigned By    Initials Name Provider Type    CS Renzo Brunson, OT Occupational Therapist               Mobility/ADL's     Row Name 03/19/23 0835          Bed Mobility    Bed Mobility supine-sit;scooting/bridging  -CS     Scooting/Bridging [place] (Bed Mobility) independent  -CS     Supine-Sit [place] (Bed Mobility) modified independence  -CS     Assistive Device (Bed Mobility) head of bed elevated  -CS     Comment, (Bed Mobility) no dizziness reported upon standing  -CS     Row Name 03/19/23 0835          Transfers    Transfers sit-stand transfer;bed-chair transfer  -CS     Row Name 03/19/23 0835          Bed-Chair Transfer    Bed-Chair [place] (Transfers) supervision  -CS     Row Name 03/19/23 0835          Sit-Stand Transfer    Sit-Stand [place] (Transfers) independent  -CS     Row Name 03/19/23 0835          Functional Mobility    Functional Mobility- Comment defer to PT for specifics, no assist required for short in-room distance w/ no AD or LOB  -CS     Row Name 03/19/23 0835          Activities of Daily Living    BADL Assessment/Intervention  lower body dressing;grooming;feeding  -CS     Row Name 03/19/23 0835          Lower Body Dressing Assessment/Training    [place] Level (Lower Body Dressing) don;socks;pants/bottoms;independent  -CS     Position (Lower Body Dressing) edge of bed sitting;unsupported standing  -CS     Row Name 03/19/23 0835          Grooming Assessment/Training    [place] Level (Grooming) hair care, combing/brushing;independent  -CS     Position (Grooming) supported sitting  -     Row Name 03/19/23 0835          Self-Feeding Assessment/Training    [place] Level (Feeding) feeding skills;finger foods;liquids to mouth;prepare tray/open items;scoop food and bring to mouth;independent  -CS     Position (Self-Feeding) supported sitting  -CS           User Key  (r) = Recorded By, (t) = Taken By, (c) = Cosigned By    Initials Name Provider Type    CS Renzo Brunson OT Occupational Therapist               Obj/Interventions     Row Name 03/19/23 0837          Sensory Assessment (Somatosensory)    Sensory Assessment (Somatosensory) UE sensation intact  -[facility] Name 03/19/23 0837          Vision Assessment/Intervention    Visual Impairment/Limitations WFL  -CS     Row Name 03/19/23 0837          Range of Motion Comprehensive    General Range of Motion no range of motion deficits identified  -     Row Name 03/19/23 0837          Strength Comprehensive (MMT)    General Manual Muscle Testing (MMT) Assessment upper extremity strength deficits identified  -CS     Comment, General Manual Muscle Testing (MMT) Assessment When tested Pt demonstrated gross BUE strength of 3+/5, functionally Pt demonstrated strength of 4+/5  -[facility] Name 03/19/23 0837          Motor Skills    Motor Skills coordination;functional endurance  -CS     Coordination bilateral;upper extremity;bimanual skills;WFL  -CS     Functional Endurance generalized weakness  -CS     Row Name 03/19/23 0837          Balance    Balance Assessment sitting static  balance;sitting dynamic balance;standing static balance;standing dynamic balance  -CS     Static Sitting Balance independent  -CS     Dynamic Sitting Balance independent  -CS     Position, Sitting Balance unsupported;sitting edge of bed  -CS     Static Standing Balance supervision  -CS     Dynamic Standing Balance standby assist  -CS     Position/Device Used, Standing Balance unsupported  -CS     Balance Interventions sit to stand;standing;sitting;occupation based/functional task;other (see comments)  -CS     Comment, Balance no LOB during ADL completion w/ dynamic challenge  -CS           User Key  (r) = Recorded By, (t) = Taken By, (c) = Cosigned By    Initials Name Provider Type    CS Renzo Brunson, OT Occupational Therapist               Goals/Plan    No documentation.                Clinical Impression     Row Name 03/19/23 0839          Pain Assessment    Pretreatment Pain Rating 0/10 - no pain  -CS     Posttreatment Pain Rating 0/10 - no pain  -CS     Row Name 03/19/23 0839          Plan of Care Review    Plan of Care Reviewed With patient  -CS     Progress no change  -CS     Outcome Evaluation Pt presents at baseline for ADL completion w/ symmetrical BUE strength and coordination/sensation intact. Demonstrates functional strength WFL for safe ADLs and related transfers. OT signing off, defer to PT for any mobility needs. Rec d/c to home w/ continued assist when needed.  -CS     Row Name 03/19/23 0839          Therapy Assessment/Plan (OT)    Rehab Potential (OT) good, to achieve stated therapy goals  -     Criteria for Skilled Therapeutic Interventions Met (OT) yes;meets criteria;skilled treatment is necessary  -CS     Therapy Frequency (OT) daily  -CS     Row Name 03/19/23 0839          Therapy Plan Review/Discharge Plan (OT)    Anticipated Discharge Disposition (OT) home with assist;home  -     Row Name 03/19/23 0839          Vital Signs    Pre Systolic BP Rehab 141  RN cleared for REEMA ulrich on RA   -CS     Pre Treatment Diastolic BP 75  -CS     Post Systolic BP Rehab 184  -CS     Post Treatment Diastolic BP 91  -CS     Posttreatment Heart Rate (beats/min) 63  -CS     O2 Delivery Pre Treatment room air  -CS     O2 Delivery Intra Treatment room air  -CS     Post SpO2 (%) 96  -CS     O2 Delivery Post Treatment room air  -CS     Pre Patient Position Supine  -CS     Intra Patient Position Standing  -CS     Post Patient Position Sitting  -CS     Row Name 03/19/23 0839          Positioning and Restraints    Pre-Treatment Position in bed  -CS     Post Treatment Position chair  -CS     In Chair notified nsg;reclined;sitting;call light within reach;encouraged to call for assist;exit alarm on;legs elevated;heels elevated;waffle cushion  -CS           User Key  (r) = Recorded By, (t) = Taken By, (c) = Cosigned By    Initials Name Provider Type    Renzo Menezes OT Occupational Therapist               Outcome Measures     Row Name 03/19/23 0841          How much help from another is currently needed...    Putting on and taking off regular lower body clothing? 4  -CS     Bathing (including washing, rinsing, and drying) 3  -CS     Toileting (which includes using toilet bed pan or urinal) 4  -CS     Putting on and taking off regular upper body clothing 4  -CS     Taking care of personal grooming (such as brushing teeth) 4  -CS     Eating meals 4  -CS     AM-PAC 6 Clicks Score (OT) 23  -CS     Row Name 03/19/23 0841          Functional Assessment    Outcome Measure Options AM-PAC 6 Clicks Daily Activity (OT)  -CS           User Key  (r) = Recorded By, (t) = Taken By, (c) = Cosigned By    Initials Name Provider Type    Renzo Menezes OT Occupational Therapist              Occupational Therapy Education     Title: PT OT SLP Therapies (Done)     Topic: Occupational Therapy (Done)     Point: Precautions (Done)     Description:   Instruct learner(s) on prescribed precautions during self-care and functional transfers.               Learning Progress Summary           Patient Acceptance, E,D, VU,DU by  at 3/19/2023 0842    Comment: in-room safety awareness                               User Key     Initials Effective Dates Name Provider Type Discipline     06/16/21 -  Renzo Brunson OT Occupational Therapist OT              OT Recommendation and Plan  Therapy Frequency (OT): daily  Plan of Care Review  Plan of Care Reviewed With: patient  Progress: no change  Outcome Evaluation: Pt presents at baseline for ADL completion w/ symmetrical BUE strength and coordination/sensation intact. Demonstrates functional strength WFL for safe ADLs and related transfers. OT signing off, defer to PT for any mobility needs. Rec d/c to home w/ continued assist when needed.  Plan of Care Reviewed With: patient  Outcome Evaluation: Pt presents at baseline for ADL completion w/ symmetrical BUE strength and coordination/sensation intact. Demonstrates functional strength WFL for safe ADLs and related transfers. OT signing off, defer to PT for any mobility needs. Rec d/c to home w/ continued assist when needed.     Time Calculation:    Time Calculation- OT     Row Name 03/19/23 0843             Time Calculation- OT    OT Start Time 0736  -CS      OT Received On 03/19/23  -CS         Untimed Charges    OT Eval/Re-eval Minutes 47  -CS         Total Minutes    Untimed Charges Total Minutes 47  -CS       Total Minutes 47  -CS            User Key  (r) = Recorded By, (t) = Taken By, (c) = Cosigned By    Initials Name Provider Type     Renzo Brunson OT Occupational Therapist              Therapy Charges for Today     Code Description Service Date Service Provider Modifiers Qty    04161889261 HC OT EVAL LOW COMPLEXITY 4 3/19/2023 Renzo Brunson OT GO 1             OT Discharge Summary  Anticipated Discharge Disposition (OT): home with assist, home  Reason for Discharge: At baseline function, other (comment) (for ADL completion)  Discharge Destination:  Home with assist, Home    Renzo Brunson, OT  3/19/2023

## 2023-03-19 NOTE — THERAPY EVALUATION
Patient Name: Emily Grant  : 1955    MRN: [id number]                              Today's Date: 3/19/2023       Admit Date: 3/17/2023    Visit Dx:     ICD-10-CM ICD-9-CM   1. Altered mental status, unspecified altered mental status type  R41.82 780.97   2. Bipolar affective disorder, current episode mixed, current episode severity unspecified ([facility])  F31.60 296.60   3. Mass of left lung  R91.8 786.6   4. Hypokalemia  E87.6 276.8   5. Generalized weakness  R53.1 780.79     Patient Active Problem List   Diagnosis   • 1.8 x 1.3 cm left lower lobe noncalcified nodule (2022)   • Compression fracture of body of thoracic vertebra ([facility])   • COPD (chronic obstructive pulmonary disease) ([facility])   • HTN (hypertension)   • Anxiety disorder   • DDD (degenerative disc disease), thoracic   • Altered mental status, unspecified altered mental status type   • Bipolar disorder, current episode mixed, moderate ([facility])   • Tobacco use   • Hypokalemia   • Elevated troponin   • Lung mass, suspected malignancy 3/17/2023   • Coronary artery disease   • Ataxia   • Failure to thrive in adult   • Weakness     Past Medical History:   Diagnosis Date   • Anxiety    • Bipolar disorder, current condition not specified as either manic or depressive    • COPD (chronic obstructive pulmonary disease) ([facility])    • Coronary artery disease    • Hypotension    • Tachycardia      Past Surgical History:   Procedure Laterality Date   • TOTAL HIP ARTHROPLASTY        General Information     Row Name 23 0909          Physical Therapy Time and Intention    Document Type evaluation  -BA     Mode of Treatment physical therapy  -BA     Row Name 23 0909          General Information    Patient Profile Reviewed yes  -BA     Prior Level of Function independent:;all household mobility;community mobility;gait;transfer;bed mobility;ADL's;driving  Pt somewhat limited historian. Reported she primarily does not use an AD for ambulation, but has walker &  cane and occasionally uses as needed on days she feels weak/unsteady. Hx falls, with 1 fall in the last 6 months. Occasionally drives but not often.  -BA     Existing Precautions/Restrictions fall;spinal;other (see comments)  DDD with stable T10 & T12 vertebral body fx's (spinal precautions for comfort)  -BA     Barriers to Rehab medically complex  -BA     Row Name 03/19/23 0909          Living Environment    People in Home alone  Reported son lives close by, checks in daily, and assists as needed.  -BA     Row Name 03/19/23 0909          Home Main Entrance    Number of Stairs, Main Entrance none  -BA     Row Name 03/19/23 0909          Stairs Within Home, Primary    Number of Stairs, Within Home, Primary none  -BA     Row Name 03/19/23 0909          Cognition    Orientation Status (Cognition) oriented x 3  -BA     Row Name 03/19/23 0909          Safety Issues, Functional Mobility    Safety Issues Affecting Function (Mobility) insight into deficits/self-awareness;safety precaution awareness;safety precautions follow-through/compliance  -BA     Impairments Affecting Function (Mobility) endurance/activity tolerance;strength;balance  -           User Key  (r) = Recorded By, (t) = Taken By, (c) = Cosigned By    Initials Name Provider Type     Luzma Lam, PT Physical Therapist               Mobility     Row Name 03/19/23 0919          Bed Mobility    Comment, (Bed Mobility) Received [facility] upon arrival and returned to chair.  -BA     Row Name 03/19/23 0919          Sit-Stand Transfer    Sit-Stand [place] (Transfers) standby assist  -BA     Comment, (Sit-Stand Transfer) Reported no dizziness upon standing.  -BA     Row Name 03/19/23 0919          Gait/Stairs (Locomotion)    [place] Level (Gait) contact guard;verbal cues  -     Distance in Feet (Gait) 120  -BA     Deviations/Abnormal Patterns (Gait) bilateral deviations;vandana decreased;gait speed decreased;stride length decreased  -BA     Bilateral  Gait Deviations heel strike decreased  -     Comment, (Gait/Stairs) Ambulated in room d/t requesting not to go out in hallway.  Demonstrated step through gait pattern with decreased vandana and step length.  VCs for improved stride length and improved heel strike upon IC. Gait distance limited by fatigue.  -           User Key  (r) = Recorded By, (t) = Taken By, (c) = Cosigned By    Initials Name Provider Type     Luzma Lam PT Physical Therapist               Obj/Interventions     Row Name 03/19/23 0922          Range of Motion Comprehensive    General Range of Motion bilateral lower extremity ROM WFL  -     Row Name 03/19/23 0922          Strength Comprehensive (MMT)    General Manual Muscle Testing (MMT) Assessment lower extremity strength deficits identified  -     Comment, General Manual Muscle Testing (MMT) Assessment Appeared to exhibit some inconsistent participation.  Initially exhibited LLE grossly 4/5 and RLE grossly 3+/5.  With repeat testing in RLE then demonstrated 4- to 4/5.  -     Row Name 03/19/23 0922          Motor Skills    Motor Skills coordination;functional endurance  -     Coordination WFL;bilateral;lower extremity  -     Functional Endurance Decreased functional endurance.  Fatigues with activity.  -     Row Name 03/19/23 0922          Balance    Balance Assessment sitting static balance;sitting dynamic balance;sit to stand dynamic balance;standing static balance;standing dynamic balance  -     Static Sitting Balance independent  -     Dynamic Sitting Balance supervision  -     Position, Sitting Balance unsupported;sitting in chair  -     Sit to Stand Dynamic Balance standby assist  -     Static Standing Balance standby assist  -     Dynamic Standing Balance contact guard  -     Position/Device Used, Standing Balance unsupported  -     Comment, Balance Fairly adequate balance overall with ambulation with no AD; no LOB noted.  -     Row Name  03/19/23 0922          Sensory Assessment (Somatosensory)    Sensory Assessment (Somatosensory) LE sensation intact  -BA           User Key  (r) = Recorded By, (t) = Taken By, (c) = Cosigned By    Initials Name Provider Type    Luzma Orozco, PT Physical Therapist               Goals/Plan     Row Name 03/19/23 0934          Bed Mobility Goal 1 (PT)    Activity/Assistive Device (Bed Mobility Goal 1, PT) sit to supine/supine to sit  -BA     [place] Level/Cues Needed (Bed Mobility Goal 1, PT) independent  -BA     Time Frame (Bed Mobility Goal 1, PT) long term goal (LTG);10 days  -BA     Row Name 03/19/23 0934          Transfer Goal 1 (PT)    Activity/Assistive Device (Transfer Goal 1, PT) sit-to-stand/stand-to-sit;bed-to-chair/chair-to-bed  -BA     [place] Level/Cues Needed (Transfer Goal 1, PT) independent  -BA     Time Frame (Transfer Goal 1, PT) long term goal (LTG);10 days  -BA     Row Name 03/19/23 0934          Gait Training Goal 1 (PT)    Activity/Assistive Device (Gait Training Goal 1, PT) gait (walking locomotion)  -BA     [place] Level (Gait Training Goal 1, PT) supervision required  -BA     Distance (Gait Training Goal 1, PT) 350  -BA     Time Frame (Gait Training Goal 1, PT) long term goal (LTG);10 days  -BA     Row Name 03/19/23 0934          Patient Education Goal (PT)    Activity (Patient Education Goal, PT) HEP  -BA     [place]/Cues/Accuracy (Memory Goal 2, PT) demonstrates adequately  -BA     Time Frame (Patient Education Goal, PT) long term goal (LTG);10 days  -BA     Row Name 03/19/23 0934          Therapy Assessment/Plan (PT)    Planned Therapy Interventions (PT) balance training;bed mobility training;gait training;home exercise program;patient/family education;postural re-education;strengthening;transfer training  -BA           User Key  (r) = Recorded By, (t) = Taken By, (c) = Cosigned By    Initials Name Provider Type    Luzma Orozco, PT Physical Therapist                Clinical Impression     Row Name 03/19/23 0927          Pain    Pretreatment Pain Rating 0/10 - no pain  -BA     Posttreatment Pain Rating 0/10 - no pain  -BA     Row Name 03/19/23 0927          Plan of Care Review    Plan of Care Reviewed With patient  -BA     Outcome Evaluation PT initial Eval completed.  Pt presents with generalized weakness, decreased functional endurance, mild balance impairment and gait instability, and decreased indep with functional mobility compared to baseline level of function.  Able to ambulate household distance with CGA and no AD.  Pt warrants skilled IP PT to improve indep with mobility and return to PLOF.  Rec HWA and HH PT upon d/c.  -BA     Row Name 03/19/23 0927          Therapy Assessment/Plan (PT)    Rehab Potential (PT) good, to achieve stated therapy goals  -     Criteria for Skilled Interventions Met (PT) yes;meets criteria;skilled treatment is necessary  -     Therapy Frequency (PT) daily  -BA     Row Name 03/19/23 0927          Vital Signs    Pre Systolic BP Rehab 184  -BA     Pre Treatment Diastolic BP 91  -BA     Post Systolic BP Rehab 145  -BA     Post Treatment Diastolic BP 74  -BA     Pretreatment Heart Rate (beats/min) 63  -BA     Posttreatment Heart Rate (beats/min) 63  -BA     Pre SpO2 (%) 95  -BA     O2 Delivery Pre Treatment room air  -BA     O2 Delivery Intra Treatment room air  -BA     Post SpO2 (%) 99  -BA     O2 Delivery Post Treatment room air  -BA     Pre Patient Position Sitting  -BA     Intra Patient Position Standing  -BA     Post Patient Position Sitting  -BA     Row Name 03/19/23 0927          Positioning and Restraints    Pre-Treatment Position sitting in chair/recliner  -BA     Post Treatment Position chair  -BA     In Chair notified nsg;reclined;sitting;call light within reach;encouraged to call for assist;exit alarm on;waffle cushion;legs elevated  -BA           User Key  (r) = Recorded By, (t) = Taken By, (c) = Cosigned By     Initials Name Provider Type    BA Luzma Lam, BARBARA Physical Therapist               Outcome Measures     Row Name 03/19/23 0935          How much help from another person do you currently need...    Turning from your back to your side while in flat bed without using bedrails? 4  -BA     Moving from lying on back to sitting on the side of a flat bed without bedrails? 4  -BA     Moving to and from a bed to a chair (including a wheelchair)? 3  -BA     Standing up from a chair using your arms (e.g., wheelchair, bedside chair)? 3  -BA     Climbing 3-5 steps with a railing? 3  -BA     To walk in hospital room? 3  -BA     AM-PAC 6 Clicks Score (PT) 20  -BA     Highest level of mobility 6 --> Walked 10 steps or more  -     Row Name 03/19/23 0935 03/19/23 0841       Functional Assessment    Outcome Measure Options AM-PAC 6 Clicks Basic Mobility (PT)  -BA AM-PAC 6 Clicks Daily Activity (OT)  -CS          User Key  (r) = Recorded By, (t) = Taken By, (c) = Cosigned By    Initials Name Provider Type    CS Rnezo Brunson OT Occupational Therapist     Luzma Lam, PT Physical Therapist                             Physical Therapy Education     Title: PT OT SLP Therapies (In Progress)     Topic: Physical Therapy (In Progress)     Point: Mobility training (In Progress)     Learning Progress Summary           Patient Acceptance, E, NR by  at 3/19/2023 0937                   Point: Home exercise program (Not Started)     Learner Progress:  Not documented in this visit.          Point: Body mechanics (In Progress)     Learning Progress Summary           Patient Acceptance, E, NR by  at 3/19/2023 0937                   Point: Precautions (In Progress)     Learning Progress Summary           Patient Acceptance, E, NR by  at 3/19/2023 0937                               User Key     Initials Effective Dates Name Provider Type Discipline     09/21/21 -  Luzma Lam, BARBARA Physical Therapist PT              PT  Recommendation and Plan  Planned Therapy Interventions (PT): balance training, bed mobility training, gait training, home exercise program, patient/family education, postural re-education, strengthening, transfer training  Plan of Care Reviewed With: patient  Outcome Evaluation: PT initial Eval completed.  Pt presents with generalized weakness, decreased functional endurance, mild balance impairment and gait instability, and decreased indep with functional mobility compared to baseline level of function.  Able to ambulate household distance with CGA and no AD.  Pt warrants skilled IP PT to improve indep with mobility and return to PLOF.  Rec HWA and HH PT upon d/c.     Time Calculation:    PT Charges     Row Name 03/19/23 0938             Time Calculation    Start Time 0829  -BA      PT Received On 03/19/23  -BA      PT Goal Re-Cert Due Date 03/29/23  -BA         Untimed Charges    PT Eval/Re-eval Minutes 57  -BA         Total Minutes    Untimed Charges Total Minutes 57  -BA       Total Minutes 57  -BA            User Key  (r) = Recorded By, (t) = Taken By, (c) = Cosigned By    Initials Name Provider Type    Luzma Orozco, PT Physical Therapist              Therapy Charges for Today     Code Description Service Date Service Provider Modifiers Qty    56993018380 HC PT EVAL LOW COMPLEXITY 4 3/19/2023 Luzma Lam, PT GP 1          PT G-Codes  Outcome Measure Options: AM-PAC 6 Clicks Basic Mobility (PT)  AM-PAC 6 Clicks Score (PT): 20  AM-PAC 6 Clicks Score (OT): 23  PT Discharge Summary  Anticipated Discharge Disposition (PT): home with assist, home with home health    Luzma Lam PT  3/19/2023

## 2023-03-19 NOTE — CONSULTS
"Jesse Reeves DO  Consulting physician: ***    Chief Complaint   Patient presents with   • Altered Mental Status         HPI:   69 yo F Admitted with \"not feeling well.\"  CT showed lung mass X 2 with increased size c/w 7/2022.  She has long h/o bipolar with multiple psych admissions.  One son, Landon, moved her from [place] to [place].  She calls him constantly.  Difficulty making decisions.  She reports only on diazepam at home.  Lexapro, Seroquel started this admit.  Psych consult pending.  I added phenobarbital for sleep.  She does not trust medical providers.  She remains undecided re. lung biopsy and \"will decide later.\"  Son does not think she will pursue/be compliant with treatment.  From my standpoint, I think she can be discharged on Mon.  Son can pick her up after 5PM (working Mon).  I referred her to hospice and strongly encouraged her to at least meet them.  Son really needs support.  DNR.       Past Medical History:   Diagnosis Date   • Anxiety    • Bipolar disorder, current condition not specified as either manic or depressive    • COPD (chronic obstructive pulmonary disease) (HCC)    • Coronary artery disease    • Hypotension    • Tachycardia      Past Surgical History:   Procedure Laterality Date   • TOTAL HIP ARTHROPLASTY       Current Facility-Administered Medications   Medication Dose Route Frequency Provider Last Rate Last Admin   • acetaminophen (TYLENOL) tablet 650 mg  650 mg Oral Q4H PRN Nighat, Blessing, APRN        Or   • acetaminophen (TYLENOL) 160 MG/5ML solution 650 mg  650 mg Oral Q4H PRN Nighat, Blessing, APRN        Or   • acetaminophen (TYLENOL) suppository 650 mg  650 mg Rectal Q4H PRN Nighat, Blessing, APRN       • atenolol (TENORMIN) tablet 25 mg  25 mg Oral BID Nighat, Blessing, APRN   25 mg at 03/19/23 0835   • Calcium Carb-Cholecalciferol 600-20 MG-MCG tablet 1 tablet  1 tablet Oral Daily Nighat, Blessing, APRN   1 tablet at 03/19/23 0834   • Calcium Replacement - Initiate " Nurse / BPA Driven Protocol   Does not apply PRN Nighat, Blessing, APRN       • cholecalciferol (VITAMIN D3) tablet 1,000 Units  1,000 Units Oral Daily Nighat, Blessing, APRN   1,000 Units at 03/19/23 0836   • diazePAM (VALIUM) tablet 5 mg  5 mg Oral BID PRN Nighat, Blessing, APRN   5 mg at 03/18/23 2142   • escitalopram (LEXAPRO) tablet 20 mg  20 mg Oral Daily Nighat, Blessing, APRN   20 mg at 03/19/23 0834   • hydrOXYzine (ATARAX) tablet 50 mg  50 mg Oral Q6H PRN Nighat, Blessing, APRN   50 mg at 03/17/23 2102   • Magnesium Standard Dose Replacement - Initiate Nurse / BPA Driven Protocol   Does not apply PRN Nighat, Blessing, APRN       • pantoprazole (PROTONIX) EC tablet 40 mg  40 mg Oral Daily Nighat, Blessing, APRN   40 mg at 03/19/23 0834   • PHENobarbital (LUMINAL) tablet 32.4 mg  32.4 mg Oral Nightly Georgia Argueta MD       • Phosphorus Replacement - Initiate Nurse / BPA Driven Protocol   Does not apply PRN Nighat, Blessing, APRN       • Potassium Replacement - Initiate Nurse / BPA Driven Protocol   Does not apply PRN Nighat, Blessing, APRN       • QUEtiapine (SEROquel) tablet 25 mg  25 mg Oral Q12H Nighat, Blessing, APRN   25 mg at 03/19/23 0835   • sodium chloride 0.9 % flush 10 mL  10 mL Intravenous PRN Gerardo Morrison, DO   10 mL at 03/19/23 0835        No Known Allergies  Family History   Problem Relation Age of Onset   • COPD Mother    • Heart failure Mother    • Heart disease Father    • Dementia Father    • Heart attack Father      Social History     Socioeconomic History   • Marital status:    Tobacco Use   • Smoking status: Every Day     Packs/day: 1.00     Years: 50.00     Pack years: 50.00     Types: Cigarettes   • Smokeless tobacco: Never   Vaping Use   • Vaping Use: Never used   Substance and Sexual Activity   • Alcohol use: Never   • Drug use: Never   • Sexual activity: Defer     Review of Systems - {ros master:827579}      BP (!) 184/91   Pulse 64   Temp 98 °F (36.7 °C)  "(Oral)   Resp 18   Ht 154.9 cm (61\")   Wt 46.9 kg (103 lb 8 oz)   SpO2 93%   BMI 19.56 kg/m²   No intake or output data in the 24 hours ending 03/19/23 0911  Physical Exam:      General Appearance:   Alert, cooperative, in no acute distress   Head:   Normocephalic, without obvious abnormality, atraumatic   Eyes:           Lids and lashes normal, conjunctivae and sclerae normal, no  icterus, no pallor, corneas clear, PERRL   Ears:   Ears appear intact with no abnormalities noted, hearing grossly normal   Throat:  No oral lesions, no thrush, oral mucosa moist   Neck:  No adenopathy, supple, trachea midline, no thyromegaly,  no JVD   Back:    No kyphosis present, no scoliosis present, range of motion normal   Lungs:    Clear to auscultation,respirations regular, even and                  unlabored    Heart:   Regular rhythm and normal rate, no murmur, no gallop, no rub, no click   Breast Exam:   Deferred   Abdomen:    Normal bowel sounds, no masses, no organomegaly, soft        non-tender, non-distended, no guarding, no rebound                tenderness   Genitalia:   Deferred   Extremities:  Moves all extremities well, no edema, no cyanosis, no            redness   Pulses:  Pedal and radial pulses palpable and equal bilaterally   Skin:  No bleeding, bruising or rash   Lymph nodes:  No palpable adenopathy cervical and supraclavicular    Psych:            Mood and affect appropriate, good eye contact, memory grossly intact   Neurologic:   Cranial nerves 2 - 12 grossly intact, sensation to light touch intact           Results from last 7 days   Lab Units 03/18/23  0357 03/17/23  1353   WBC 10*3/mm3 6.42 8.13   HEMOGLOBIN g/dL 10.6* 12.2   HEMATOCRIT % 32.0* 37.4   PLATELETS 10*3/mm3 171 198   SODIUM mmol/L 137 137   POTASSIUM mmol/L 3.9 2.9*   CHLORIDE mmol/L 103 100   CO2 mmol/L 26.0 26.0   BUN mg/dL 10 17   CREATININE mg/dL 0.36* 0.75   CALCIUM mg/dL 8.5* 10.0   BILIRUBIN mg/dL  --  0.4   ALK PHOS U/L  --  91 "   ALT (SGPT) U/L  --  6   AST (SGOT) U/L  --  11   GLUCOSE mg/dL 97 125*     Imaging Results (Last 72 Hours)     Procedure Component Value Units Date/Time    CT Abdomen Pelvis With Contrast [[id number]] Collected: 03/18/23 1844     Updated: 03/18/23 1857    Narrative:      CT ABDOMEN PELVIS W CONTRAST    Date of Exam: 3/18/2023 3:26 PM EDT    Indication: lung mass, ? mets.    Comparison: CT chest from March 17, 2023 and lumbar spine radiographs from January 19, 2016    Technique: Axial CT images were obtained of the abdomen and pelvis following the uneventful intravenous administration of 85 mL Isovue-300. Reconstructed coronal and sagittal images were also obtained. Automated exposure control and iterative   construction methods were used.    Findings:  Within the lung bases is bibasilar atelectasis and trace bilateral pleural effusions.    There is focal hepatic steatosis near the falciform ligament. The gallbladder, adrenal glands, kidneys, spleen, and pancreas are unremarkable.    The stomach appears normal. The small bowel appears normal in caliber and configuration. The colon appears normal. The appendix appears normal. There is no ascites or loculated collection. No abnormally enlarged lymph nodes are identified. There is   partially calcified plaque in the abdominal aorta.    The pelvis is partially excluded by streak artifact from a right hip arthroplasty. Within this constraint, the rectum, uterus, and urinary bladder are grossly unremarkable.    No aggressive osseous lesions are identified. There is a stable severe compression deformity at T12 with bony retropulsion. There is a mild superior endplate compression deformity at L2 with an associated fracture line and sclerosis, new from prior   radiographs from 2016. There is a mild chronic appearing inferior endplate compression deformity at L4.      Impression:      Impression:  1.No evidence of malignancy or metastasis within abdomen/pelvis.  2.Bibasilar  atelectasis with trace bilateral pleural effusions.  3.Mild superior endplate compression deformity at L2 with associated fracture line and sclerosis, new from prior radiographs from 2016. This could be an acute or subacute fracture. Correlate with point tenderness and history of recent trauma.    Electronically Signed: Rajiv Hughes    3/18/2023 6:54 PM EDT    Workstation ID: [id number]    CT Chest Without Contrast Diagnostic [[id number]] Collected: 03/17/23 1630     Updated: 03/17/23 1652    Narrative:      CT CHEST WO CONTRAST DIAGNOSTIC    Date of Exam: 3/17/2023 4:21 PM EDT    Indication: Altered mental status, hx pna.    Comparison: AP chest x-ray 3/17/2023, 2 view chest x-ray 9/2/2022, CT chest 7/30/2022.    Technique: Axial CT images were obtained of the chest without contrast administration.  Reconstructed coronal and sagittal images were also obtained. Automated exposure control and iterative construction methods were used.    Findings:  Exam is mildly limited by motion artifact.    Previously seen left lower lobe irregular nodule has increased in size and now measures 3.2 x 2.7 cm on axial image 51, previously 1.8 x 1.3 cm. Irregular left upper lobe mass measures 4.2 x 3.4 cm on axial image 43, previously 5 mm. Mild-to-moderate   paraseptal emphysematous changes are upper lobe predominant. There is no pleural or pericardial effusion. Heart size is not enlarged. There are coronary artery calcifications. Enlarged mediastinal lymph nodes have increased in size since previous CT.   Index subcarinal lymph node measures 2.4 x 2.1 cm on axial image 45, previously 1.8 x 1.1 cm. Index AP window lymph node measures 1.9 x 1.8 seen on axial image 37, previously 1.1 x 0.8 cm. Stable 1 cm left adrenal nodule is likely a lipid rich adenoma   given its Hounsfield unit measurement of 0. T10 and T12 vertebral body fractures with severe height loss and retropulsion into the spinal canal appear stable.      Impression:       Impression:  1.Increased size of 4.2 cm left upper lobe mass and 3.2 cm left lower lobe mass, highly concerning for primary lung malignancy. Recommend tissue sampling.  2.Increased size of mediastinal lymphadenopathy, likely due to metastatic disease.  3.Stable T10 and T12 vertebral body fractures with severe height loss and retropulsion into the spinal canal. Please see above for additional stable findings.    Electronically Signed: Paula Carreon    3/17/2023 4:49 PM EDT    Workstation ID: [id number]    CT Head Without Contrast [[id number]] Collected: 03/17/23 1628     Updated: 03/17/23 1638    Narrative:      CT HEAD WO CONTRAST    Date of Exam: 3/17/2023 4:21 PM EDT    Indication: ams.    Comparison: None available.    Technique: Axial CT images were obtained of the head without contrast administration.  Reconstructed coronal and sagittal images were also obtained. Automated exposure control and iterative construction methods were used.      FINDINGS:    Brain/Ventricles: Mildly motion Limited imaging demonstrates no acute intracranial hemorrhage or mass effect. Mild diffuse atrophy is noted. White matter changes compatible small vessel ischemic disease in this age group noted.    Orbits: Dilation of the orbits is nondiagnostic due to motion.    Sinuses: Paranasal sinuses are markedly limited by motion. Mastoid air cells are grossly clear.    Soft Tissues/Skull: No gross acute abnormality noted given limitations from motion      Impression:      Motion Limited exam without definite acute intracranial abnormality    Atrophy and white matter changes compatible small vessel ischemic disease in this age group.    Additional follow-up can be obtained as clinically indicated       Electronically Signed: Nehemias Candelario    3/17/2023 4:35 PM EDT    Workstation ID: [id number]    XR Chest 1 View [[id number]] Collected: 03/17/23 1422     Updated: 03/17/23 1430    Narrative:      XR CHEST 1 VW    Date of Exam: 3/17/2023 1:55 PM  EDT    Indication: Weak/Dizzy/AMS triage protocol.    Comparison: AP chest x-ray 9/2/2022, CT chest 7/30/2022    Findings:  Left lower lobe nodule is better visualized on chest CT. There is a new linear opacity in the left lower lung. Lung appears clear. No pneumothorax or large pleural effusion is seen. Heart size is not enlarged. There are arthritic changes of both   shoulders.      Impression:      Impression:  1.New linear left lower lung opacity, likely due to subsegmental atelectasis.  2.Left lower lobe nodule is better visualized on comparison chest CT.    Electronically Signed: Paula Carreon    3/17/2023 2:27 PM EDT    Workstation ID: [id number]          Impression: ***  Plan: ***    Time: {Time spent:[id number]}    Georgia Argueta MD  03/19/23  09:11 EDT

## 2023-03-20 VITALS
BODY MASS INDEX: 19.92 KG/M2 | OXYGEN SATURATION: 94 % | HEART RATE: 68 BPM | WEIGHT: 105.5 LBS | SYSTOLIC BLOOD PRESSURE: 144 MMHG | DIASTOLIC BLOOD PRESSURE: 67 MMHG | RESPIRATION RATE: 16 BRPM | TEMPERATURE: 98.1 F | HEIGHT: 61 IN

## 2023-03-20 LAB
ANION GAP SERPL CALCULATED.3IONS-SCNC: 10 MMOL/L (ref 5–15)
BUN SERPL-MCNC: 13 MG/DL (ref 8–23)
BUN/CREAT SERPL: 24.5 (ref 7–25)
CALCIUM SPEC-SCNC: 9 MG/DL (ref 8.6–10.5)
CHLORIDE SERPL-SCNC: 103 MMOL/L (ref 98–107)
CO2 SERPL-SCNC: 25 MMOL/L (ref 22–29)
CREAT SERPL-MCNC: 0.53 MG/DL (ref 0.57–1)
DEPRECATED RDW RBC AUTO: 46.1 FL (ref 37–54)
EGFRCR SERPLBLD CKD-EPI 2021: 100.3 ML/MIN/1.73
ERYTHROCYTE [DISTWIDTH] IN BLOOD BY AUTOMATED COUNT: 12.9 % (ref 12.3–15.4)
GLUCOSE SERPL-MCNC: 89 MG/DL (ref 65–99)
HCT VFR BLD AUTO: 37.5 % (ref 34–46.6)
HGB BLD-MCNC: 12.2 G/DL (ref 12–15.9)
MCH RBC QN AUTO: 31.8 PG (ref 26.6–33)
MCHC RBC AUTO-ENTMCNC: 32.5 G/DL (ref 31.5–35.7)
MCV RBC AUTO: 97.7 FL (ref 79–97)
PLATELET # BLD AUTO: 194 10*3/MM3 (ref 140–450)
PMV BLD AUTO: 11.2 FL (ref 6–12)
POTASSIUM SERPL-SCNC: 4.3 MMOL/L (ref 3.5–5.2)
RBC # BLD AUTO: 3.84 10*6/MM3 (ref 3.77–5.28)
SODIUM SERPL-SCNC: 138 MMOL/L (ref 136–145)
WBC NRBC COR # BLD: 5.89 10*3/MM3 (ref 3.4–10.8)

## 2023-03-20 PROCEDURE — 85027 COMPLETE CBC AUTOMATED: CPT | Performed by: NURSE PRACTITIONER

## 2023-03-20 PROCEDURE — G0378 HOSPITAL OBSERVATION PER HR: HCPCS

## 2023-03-20 PROCEDURE — 80048 BASIC METABOLIC PNL TOTAL CA: CPT | Performed by: NURSE PRACTITIONER

## 2023-03-20 PROCEDURE — 99239 HOSP IP/OBS DSCHRG MGMT >30: CPT | Performed by: NURSE PRACTITIONER

## 2023-03-20 RX ORDER — PHENOBARBITAL 32.4 MG/1
32.4 TABLET ORAL NIGHTLY
Qty: 3 TABLET | Refills: 0 | Status: SHIPPED | OUTPATIENT
Start: 2023-03-20 | End: 2023-03-31

## 2023-03-20 RX ADMIN — ATENOLOL 25 MG: 25 TABLET ORAL at 08:25

## 2023-03-20 RX ADMIN — ESCITALOPRAM OXALATE 20 MG: 20 TABLET ORAL at 08:25

## 2023-03-20 RX ADMIN — Medication 1000 UNITS: at 08:26

## 2023-03-20 RX ADMIN — Medication 1 TABLET: at 08:25

## 2023-03-20 RX ADMIN — PANTOPRAZOLE SODIUM 40 MG: 40 TABLET, DELAYED RELEASE ORAL at 06:23

## 2023-03-20 NOTE — PROGRESS NOTES
Rosalba, [facility], did a BS visit w/ pt & called the pt.’s son, Landon. Per there conversation, Landon, states that he wants to wait to get the pt home first & then “talk the pt into hospice”. Rosalba did an out pt Palliative Care referral for the pt. Hospice will not be providing any DME for the pt @ d/c. Message had been sent to the pt.’s care team via [employer]. Pt.’s hospice referral will be closed.

## 2023-03-20 NOTE — PLAN OF CARE
Problem: Adult Inpatient Plan of Care  Goal: Plan of Care Review  Outcome: Ongoing, Not Progressing  Goal: Patient-Specific Goal (Individualized)  Outcome: Ongoing, Not Progressing  Goal: Absence of Hospital-Acquired Illness or Injury  Outcome: Ongoing, Not Progressing  Intervention: Identify and Manage Fall Risk  Recent Flowsheet Documentation  Taken 3/20/2023 0800 by Bryan Reese RN  Safety Promotion/Fall Prevention:  • activity supervised  • assistive device/personal items within reach  • clutter free environment maintained  • elopement precautions  • fall prevention program maintained  • gait belt  • lighting adjusted  • mobility aid in reach  • nonskid shoes/slippers when out of bed  • toileting scheduled  • room organization consistent  • safety round/check completed  Intervention: Prevent Skin Injury  Recent Flowsheet Documentation  Taken 3/20/2023 0800 by Bryan Reese RN  Body Position: position changed independently  Skin Protection:  • adhesive use limited  • incontinence pads utilized  • transparent dressing maintained  • tubing/devices free from skin contact  Intervention: Prevent and Manage VTE (Venous Thromboembolism) Risk  Recent Flowsheet Documentation  Taken 3/20/2023 0800 by Bryan Reese RN  Activity Management:  • activity adjusted per tolerance  • activity encouraged  VTE Prevention/Management:  • bilateral  • sequential compression devices off  • patient refused intervention  Range of Motion: active ROM (range of motion) encouraged  Goal: Optimal Comfort and Wellbeing  Outcome: Ongoing, Not Progressing  Intervention: Provide Person-Centered Care  Recent Flowsheet Documentation  Taken 3/20/2023 0800 by Bryan Reese RN  Trust Relationship/Rapport: thoughts/feelings acknowledged  Goal: Readiness for Transition of Care  Outcome: Ongoing, Not Progressing     Problem: Fall Injury Risk  Goal: Absence of Fall and Fall-Related Injury  Outcome: Ongoing, Not Progressing  Intervention:  Identify and Manage Contributors  Recent Flowsheet Documentation  Taken 3/20/2023 0800 by Bryan Reese RN  Medication Review/Management: medications reviewed  Self-Care Promotion:  • BADL personal objects within reach  • independence encouraged  Intervention: Promote Injury-Free Environment  Recent Flowsheet Documentation  Taken 3/20/2023 0800 by Bryan Reese RN  Safety Promotion/Fall Prevention:  • activity supervised  • assistive device/personal items within reach  • clutter free environment maintained  • elopement precautions  • fall prevention program maintained  • gait belt  • lighting adjusted  • mobility aid in reach  • nonskid shoes/slippers when out of bed  • toileting scheduled  • room organization consistent  • safety round/check completed     Problem: Palliative Care  Goal: Enhanced Quality of Life  Outcome: Ongoing, Not Progressing  Intervention: Optimize Function  Recent Flowsheet Documentation  Taken 3/20/2023 0800 by Bryan Reese RN  Sensory Stimulation Regulation:  • lighting decreased  • television on  Fatigue Management:  • activity schedule adjusted  • frequent rest breaks encouraged  Intervention: Optimize Psychosocial Wellbeing  Recent Flowsheet Documentation  Taken 3/20/2023 0800 by Bryan Reese RN  Supportive Measures:  • active listening utilized  • verbalization of feelings encouraged  Family/Support System Care:  • self-care encouraged  • support provided   Goal Outcome Evaluation:       Plan of Care Reviewed With: patient  Progress: improving            Health Care Proxy (HCP)

## 2023-03-20 NOTE — CASE MANAGEMENT/SOCIAL WORK
Continued Stay Note  [facility]     Patient Name: Emily Grant  MRN: [id number]  Today's Date: 3/20/2023    Admit Date: 3/17/2023        Discharge Plan     Row Name 03/20/23 1151       Plan    Plan Comments MSW notified about inpatient psychiatrist consult. MSW also notified pt is discharging home today. No availability for [facility] psych assessment until Wedsnesday. MSW spoke with pt at bedside and provided resources and information for outpatient mental health resources. Pt reports she already knows and has resources, however, she was agreeable to take the resources in case she ever needs. Pt had no further ss needs or concerns at this time.               Discharge Codes    No documentation.               Expected Discharge Date and Time     Expected Discharge Date Expected Discharge Time    Mar 20, 2023             BASILIO Ugalde

## 2023-03-20 NOTE — CONSULTS
Hospice consult received. Chart reviewed. Pt. is outside of the [facility] service area & will be a pt of [facility]. Writer did speak w/ Dr. Argueta & received report r/t the pt. hospice referral & the pt.’s psychosocial issues. Referral called to Rosalba, [facility], to let her know about the referral & to make contact w/ the pt.’s son. Rosalba knows she will need to reach out to the pt.’s son, Landon, & is aware of the pt.’s mental health issues. Rosalba will see the pt today. Rosalba is also aware that the pt does have d/c orders. Hospice will continue to follow. If further assistance is needed, please call [phone].

## 2023-03-20 NOTE — CASE MANAGEMENT/SOCIAL WORK
Continued Stay Note  [facility]     Patient Name: Emily Grant  MRN: [id number]  Today's Date: 3/20/2023    Admit Date: 3/17/2023    Plan: Home   Discharge Plan     Row Name 03/20/23 1229       Plan    Plan Home    Plan Comments I spoke with patient this am in regards to discharge planning. She resides in [place]. She tells me her son lives down the road from her. She has the following DME: Cane, rolling walker, nebulizer, grab bars and shower bench. Hre PCP is Jesse Reeves. I offered home health, if not going with home hospice and she declined. I did give her sitter resource list. She tells me she has advanced directives.    Patient oxygen sat of 87 % at rest, when 1 liter applied then sat 91%. Obtaining oxygen through [employer]. I spoke with patient and her son, Landon and he is aware she needs the oxygen at discharge and cannot wait for hospice to arrange in a few days.     Final Discharge Disposition Code 01 - home or self-care    Row Name        Plan    Plan Comments                Discharge Codes    No documentation.               Expected Discharge Date and Time     Expected Discharge Date Expected Discharge Time    Mar 20, 2023             Kimi Srinivasan RN

## 2023-03-20 NOTE — PLAN OF CARE
Problem: Adult Inpatient Plan of Care  Goal: Plan of Care Review  Outcome: Ongoing, Progressing  Flowsheets (Taken 3/20/2023 0431)  Progress: improving  Plan of Care Reviewed With: patient  Goal: Patient-Specific Goal (Individualized)  Outcome: Ongoing, Progressing  Goal: Absence of Hospital-Acquired Illness or Injury  Outcome: Ongoing, Progressing  Intervention: Identify and Manage Fall Risk  Recent Flowsheet Documentation  Taken 3/20/2023 0415 by Marilu Doty RN  Safety Promotion/Fall Prevention:   activity supervised   assistive device/personal items within reach   clutter free environment maintained   fall prevention program maintained   nonskid shoes/slippers when out of bed   safety round/check completed  Taken 3/20/2023 0213 by Marilu Doty RN  Safety Promotion/Fall Prevention:   activity supervised   assistive device/personal items within reach   clutter free environment maintained   fall prevention program maintained   safety round/check completed  Taken 3/20/2023 0000 by Marilu Doty RN  Safety Promotion/Fall Prevention:   activity supervised   assistive device/personal items within reach   clutter free environment maintained   fall prevention program maintained   nonskid shoes/slippers when out of bed   safety round/check completed  Taken 3/19/2023 2212 by Marilu Doty, RN  Safety Promotion/Fall Prevention:   activity supervised   assistive device/personal items within reach   clutter free environment maintained   fall prevention program maintained   lighting adjusted   nonskid shoes/slippers when out of bed   safety round/check completed  Taken 3/19/2023 2012 by Marilu Doty, RN  Safety Promotion/Fall Prevention:   assistive device/personal items within reach   activity supervised   clutter free environment maintained   fall prevention program maintained   nonskid shoes/slippers when out of bed   safety round/check completed  Intervention: Prevent Skin Injury  Recent Flowsheet  Documentation  Taken 3/20/2023 0415 by Marilu Doty RN  Body Position:   position changed independently   foot of bed elevated   supine, legs elevated   weight shifting  Skin Protection:   adhesive use limited   incontinence pads utilized   transparent dressing maintained   tubing/devices free from skin contact  Taken 3/20/2023 0213 by Marilu Doty RN  Body Position:   position changed independently   foot of bed elevated   supine, legs elevated   weight shifting  Skin Protection:   adhesive use limited   incontinence pads utilized   transparent dressing maintained   tubing/devices free from skin contact  Taken 3/20/2023 0000 by Marilu Doty RN  Body Position:   position changed independently   foot of bed elevated   legs elevated   supine, legs elevated   weight shifting  Skin Protection:   adhesive use limited   incontinence pads utilized   transparent dressing maintained   tubing/devices free from skin contact  Taken 3/19/2023 2212 by Marilu Doty RN  Body Position:   position changed independently   foot of bed elevated   supine, legs elevated   tilted   weight shifting  Skin Protection:   adhesive use limited   incontinence pads utilized   transparent dressing maintained   tubing/devices free from skin contact  Taken 3/19/2023 2012 by Marilu Doty RN  Body Position:   position changed independently   foot of bed elevated   supine, legs elevated   weight shifting  Skin Protection:   adhesive use limited   incontinence pads utilized   transparent dressing maintained   tubing/devices free from skin contact  Intervention: Prevent and Manage VTE (Venous Thromboembolism) Risk  Recent Flowsheet Documentation  Taken 3/20/2023 0415 by Marilu Doty RN  Activity Management:   activity adjusted per tolerance   activity encouraged  VTE Prevention/Management:   bilateral   patient refused intervention  Range of Motion: active ROM (range of motion) encouraged  Taken 3/20/2023 0213 by Marilu Doty  RN  Activity Management:   activity adjusted per tolerance   activity encouraged  VTE Prevention/Management:   bilateral   patient refused intervention  Range of Motion: active ROM (range of motion) encouraged  Taken 3/20/2023 0000 by Marilu Doty RN  Activity Management:   activity adjusted per tolerance   activity encouraged  VTE Prevention/Management:   bilateral   patient refused intervention  Range of Motion: active ROM (range of motion) encouraged  Taken 3/19/2023 2212 by Marilu Doty RN  Activity Management:   activity adjusted per tolerance   activity encouraged  VTE Prevention/Management:   bilateral   patient refused intervention  Range of Motion: active ROM (range of motion) encouraged  Taken 3/19/2023 2012 by Marilu Doty RN  Activity Management:   activity adjusted per tolerance   activity encouraged  VTE Prevention/Management:   bilateral   patient refused intervention  Range of Motion: active ROM (range of motion) encouraged  Intervention: Prevent Infection  Recent Flowsheet Documentation  Taken 3/20/2023 0415 by Marilu Doty RN  Infection Prevention:   environmental surveillance performed   hand hygiene promoted   personal protective equipment utilized   rest/sleep promoted   single patient room provided   visitors restricted/screened  Taken 3/20/2023 0213 by Marilu Doty RN  Infection Prevention:   environmental surveillance performed   hand hygiene promoted   personal protective equipment utilized   rest/sleep promoted   visitors restricted/screened   single patient room provided  Taken 3/20/2023 0000 by Marilu Doty RN  Infection Prevention:   environmental surveillance performed   hand hygiene promoted   personal protective equipment utilized   rest/sleep promoted   single patient room provided   visitors restricted/screened  Taken 3/19/2023 2212 by Marilu Doty RN  Infection Prevention:   environmental surveillance performed   hand hygiene promoted   personal protective  equipment utilized   rest/sleep promoted   single patient room provided   visitors restricted/screened  Taken 3/19/2023 2012 by Marilu Doty RN  Infection Prevention:   environmental surveillance performed   hand hygiene promoted   personal protective equipment utilized   rest/sleep promoted   single patient room provided   visitors restricted/screened  Goal: Optimal Comfort and Wellbeing  Outcome: Ongoing, Progressing  Intervention: Provide Person-Centered Care  Recent Flowsheet Documentation  Taken 3/20/2023 0415 by Marilu Doty RN  Trust Relationship/Rapport: thoughts/feelings acknowledged  Taken 3/20/2023 0213 by Marilu Doty RN  Trust Relationship/Rapport: thoughts/feelings acknowledged  Taken 3/20/2023 0000 by Marilu Doty RN  Trust Relationship/Rapport: thoughts/feelings acknowledged  Taken 3/19/2023 2212 by Marilu Doty RN  Trust Relationship/Rapport: thoughts/feelings acknowledged  Taken 3/19/2023 2012 by Marilu Doty RN  Trust Relationship/Rapport:   care explained   choices provided   emotional support provided   empathic listening provided   questions answered   questions encouraged   thoughts/feelings acknowledged  Goal: Readiness for Transition of Care  Outcome: Ongoing, Progressing     Problem: Fall Injury Risk  Goal: Absence of Fall and Fall-Related Injury  Outcome: Ongoing, Progressing  Intervention: Identify and Manage Contributors  Recent Flowsheet Documentation  Taken 3/20/2023 0415 by Marilu Doty RN  Medication Review/Management: medications reviewed  Self-Care Promotion:   independence encouraged   BADL personal objects within reach  Taken 3/20/2023 0213 by Marilu Doty RN  Medication Review/Management: medications reviewed  Self-Care Promotion:   independence encouraged   BADL personal objects within reach  Taken 3/20/2023 0000 by Marilu Doty RN  Medication Review/Management: medications reviewed  Self-Care Promotion:   BADL personal objects within reach    independence encouraged  Taken 3/19/2023 2212 by Marilu Doty RN  Medication Review/Management: medications reviewed  Self-Care Promotion:   BADL personal objects within reach   independence encouraged  Taken 3/19/2023 2012 by Marilu Doty RN  Medication Review/Management: medications reviewed  Self-Care Promotion:   BADL personal objects within reach   independence encouraged  Intervention: Promote Injury-Free Environment  Recent Flowsheet Documentation  Taken 3/20/2023 0415 by Marilu Doty RN  Safety Promotion/Fall Prevention:   activity supervised   assistive device/personal items within reach   clutter free environment maintained   fall prevention program maintained   nonskid shoes/slippers when out of bed   safety round/check completed  Taken 3/20/2023 0213 by Marilu Doty RN  Safety Promotion/Fall Prevention:   activity supervised   assistive device/personal items within reach   clutter free environment maintained   fall prevention program maintained   safety round/check completed  Taken 3/20/2023 0000 by Marilu Doty RN  Safety Promotion/Fall Prevention:   activity supervised   assistive device/personal items within reach   clutter free environment maintained   fall prevention program maintained   nonskid shoes/slippers when out of bed   safety round/check completed  Taken 3/19/2023 2212 by Marilu Doty RN  Safety Promotion/Fall Prevention:   activity supervised   assistive device/personal items within reach   clutter free environment maintained   fall prevention program maintained   lighting adjusted   nonskid shoes/slippers when out of bed   safety round/check completed  Taken 3/19/2023 2012 by Marilu Doty RN  Safety Promotion/Fall Prevention:   assistive device/personal items within reach   activity supervised   clutter free environment maintained   fall prevention program maintained   nonskid shoes/slippers when out of bed   safety round/check completed     Problem: Palliative  Care  Goal: Enhanced Quality of Life  Outcome: Ongoing, Progressing  Intervention: Promote Advance Care Planning  Recent Flowsheet Documentation  Taken 3/20/2023 0000 by Marilu Doty RN  Life Transition/Adjustment: end-of-life care initiated  Intervention: Optimize Function  Recent Flowsheet Documentation  Taken 3/20/2023 0415 by Marilu Doty RN  Sleep/Rest Enhancement: awakenings minimized  Taken 3/20/2023 0213 by Marilu Doty RN  Sleep/Rest Enhancement: awakenings minimized  Taken 3/20/2023 0000 by Marilu Doty RN  Sensory Stimulation Regulation:   lighting decreased   television on  Sleep/Rest Enhancement: awakenings minimized  Taken 3/19/2023 2212 by Marilu Doty RN  Sensory Stimulation Regulation:   lighting decreased   television on  Sleep/Rest Enhancement: awakenings minimized  Taken 3/19/2023 2012 by Marilu Doty RN  Sensory Stimulation Regulation: quiet environment promoted  Sleep/Rest Enhancement: awakenings minimized  Intervention: Optimize Psychosocial Wellbeing  Recent Flowsheet Documentation  Taken 3/20/2023 0415 by Marilu Doty RN  Supportive Measures:   active listening utilized   verbalization of feelings encouraged  Family/Support System Care:   support provided   self-care encouraged  Taken 3/20/2023 0213 by Marilu Doty RN  Supportive Measures:   active listening utilized   verbalization of feelings encouraged  Family/Support System Care:   support provided   self-care encouraged  Taken 3/20/2023 0000 by Marilu Doty RN  Supportive Measures:   active listening utilized   verbalization of feelings encouraged  Family/Support System Care:   self-care encouraged   support provided  Taken 3/19/2023 2212 by Marilu Doty RN  Supportive Measures:   active listening utilized   verbalization of feelings encouraged  Family/Support System Care:   self-care encouraged   support provided  Taken 3/19/2023 2012 by Marilu Doty RN  Supportive Measures:   active listening  utilized   verbalization of feelings encouraged  Family/Support System Care:   self-care encouraged   support provided   Goal Outcome Evaluation:  Plan of Care Reviewed With: patient        Progress: improving

## 2023-03-20 NOTE — PLAN OF CARE
Goal Outcome Evaluation:      Visited with pt at bedside. PT was slightly confused but pleasant. PT asked that I would think of her in prayer but not at this visit. No family present.

## 2023-03-20 NOTE — DISCHARGE SUMMARY
[facility]  DISCHARGE SUMMARY    Patient Name: Emily Grant  : 1955  MRN: [id number]    Date of Admission: 3/17/2023  1:38 PM  Date of Discharge: 3/20/2023  Primary Care Physician: Jesse Reeves DO    Consults     Date and Time Order Name Status Description    3/18/2023 12:42 PM Inpatient Palliative Care MD Consult      3/18/2023 12:34 AM Inpatient Psychiatrist Consult            Hospital Course   Presenting Problem:   Altered mental status, unspecified altered mental status type [R41.82]    Active Hospital Problems    Diagnosis  POA   • **Lung mass, suspected malignancy 3/17/2023 [R91.8]  Yes   • Coronary artery disease [I25.10]  Yes   • Ataxia [R27.0]  Yes   • Failure to thrive in adult [R62.7]  Yes   • Weakness [R53.1]  Yes   • Altered mental status, unspecified altered mental status type [R41.82]  Yes   • Bipolar disorder, current episode mixed, moderate (HCC) [F31.62]  Yes   • Tobacco use [Z72.0]  Yes   • Hypokalemia [E87.6]  Yes   • Elevated troponin [R77.8]  Yes   • Anxiety disorder [F41.9]  Yes   • COPD (chronic obstructive pulmonary disease) (HCC) [J44.9]  Yes   • HTN (hypertension) [I10]  Yes   • DDD (degenerative disc disease), thoracic [M51.34]  Yes      Resolved Hospital Problems   No resolved problems to display.          Hospital Course:  Emily Grant is a 68 y.o. female with past medical history of bipolar disorder, anxiety, degenerative disc disease, COPD, hypertension, lung mass, tobacco abuse, and CAD who presented to the hospital with failure to thrive, altered mental status/confusion, global weakness and inability to care for herself.  She was found to have new left upper and lower lobe masses suspicious for primary malignancy.     These problems are new to me today     This patient's problems and plans were partially entered by my partner and updated as appropriate by me 23.     Left upper and lower lobe lung masses,  suspicious for malignancy  COPD  Former tobacco abuse  -Request palliative care consult 3/18/2023  -Patient unsure if she would pursue treatment, also discussed with son     Failure to thrive in adult  Altered mental status/confusion  Global weakness  Ataxia  -PT and OT consulted and OT has signed off stating that patient is fine with her ADLs, but then when he asked her to lift her arms and things she acted like she could not do it.  PT recommends home with walker and HH versus skilled IP if she wants to mobilize independently  -Will likely need placement  -Case management consulted  -Fall precautions     Bipolar disorder  - Requested telemedicine psych visit-pending  - Continue Valium and Seroquel  -Continue Lexapro     Hypokalemia  -Replace per protocol     History of hypertension  History of CAD  Elevated high-sensitivity troponin  -Continue atenolol  -EKG with no acute ST changes  -Clinically no chest pain or shortness of breath at rest     Degenerative disc disease  -Stable T10 and T12 vertebral body fractures with height loss   with past medical history of bipolar disorder, anxiety, degenerative disc disease, COPD, hypertension, lung mass, tobacco abuse, and CAD who presented to the hospital with failure to thrive, altered mental status/confusion, global weakness and inability to care for herself.  She was found to have new left upper and lower lobe masses suspicious for primary malignancy.     Left upper and lower lobe lung masses, suspicious for malignancy  COPD  Former tobacco abuse  -Request palliative care consult 3/18/2023  -Patient unsure if she would pursue treatment, also discussed with son     Failure to thrive in adult  Altered mental status/confusion  Global weakness  Ataxia  -PT and OT consulted and OT has signed off stating that patient is fine with her ADLs, but then when he asked her to lift her arms and things she acted like she could not do it.  PT recommends home with walker and HH versus  "skilled IP if she wants to mobilize independently  -Will likely need placement  -Case management consulted  -Fall precautions     Bipolar disorder  - Requested telemedicine psych visit-pending  - Continue Valium and Seroquel  -Continue Lexapro  --Luminal started by palliative care  --Patient and son decided that she would go home with Hospice, with no follow-up of her lung tumors     Hypokalemia  -Replace per protocol     History of hypertension  History of CAD  Elevated high-sensitivity troponin  -Continue atenolol  -EKG with no acute ST changes  -Clinically no chest pain or shortness of breath at rest     Degenerative disc disease  -Stable T10 and T12 vertebral body fractures with height loss     Patient is ready to be discharged home and her son will pick her up after work today.  Discharge recommendations are listed below.      Discharge Follow Up Recommendations for outpatient labs/diagnostics:  --Home with hospice  --Start luminal as prescribed    Day of Discharge     HPI:   Patient sitting up in bed in [facility].  Dr. Argueta had a long conversation with patient and her son, landon, yesterday and Landon felt like the patient would not complete any of her treatments, so he is going to go with hospice.  Patient was  supposed to decide if she wanted a biopsy done or not, but she  deferred to her son, \"because I cannot make decisions\".  Confirmed with son this morning and he states that he wants to take his mother home this afternoon and they want hospice at home.    Review of Systems  Gen- No fevers, chills  CV- No chest pain, palpitations  Resp- No cough, dyspnea  GI- No N/V/D, abd pain  All other systems have been reviewed and the pertinent positives and negatives are listed above in the HPI or ROS    Vital Signs:   Temp:  [97.5 °F (36.4 °C)-98 °F (36.7 °C)] 98 °F (36.7 °C)  Heart Rate:  [52-73] 59  Resp:  [16] 16  BP: ()/(45-73) 157/68  Flow (L/min):  [1] 1      Physical Exam:  Constitutional: Alert, chronically " "ill-appearing older female sitting up in bed in NAD  Eyes: EOMI, sclerae anicteric, no conjunctival injection  Head: NCAT  ENT: [place], moist mucous membranes   Respiratory: Nonlabored, symmetrical chest expansion, crackles in bases bilaterally, 97% RA  Cardiovascular: RRR, HR 66, no M/R/G, +DP pulses bilaterally  Gastrointestinal: Soft, NT, ND +BS  Musculoskeletal: TAYLOR; no LE edema bilaterally  Neurologic: Oriented to person, \"[facility]\", month and year, but unsure why she was admitted, strength symmetric in all extremities with generalized weakness, follows all commands, speech clear  Skin: No rashes on exposed skin  Psychiatric: Pleasant and cooperative; flat affect  Pertinent  and/or Most Recent Results     LAB RESULTS:      Lab 03/18/23  0357 03/17/23  1353   WBC 6.42 8.13   HEMOGLOBIN 10.6* 12.2   HEMATOCRIT 32.0* 37.4   PLATELETS 171 198   NEUTROS ABS 3.96 6.38   IMMATURE GRANS (ABS) 0.02 0.02   LYMPHS ABS 1.53 0.90   MONOS ABS 0.75 0.80   EOS ABS 0.14 0.01   MCV 97.3* 97.4*         Lab 03/18/23  0357 03/17/23 2058 03/17/23  1353   SODIUM 137  --  137   POTASSIUM 3.9  --  2.9*   CHLORIDE 103  --  100   CO2 26.0  --  26.0   ANION GAP 8.0  --  11.0   BUN 10  --  17   CREATININE 0.36*  --  0.75   EGFR 110.1  --  86.3   GLUCOSE 97  --  125*   CALCIUM 8.5*  --  10.0   MAGNESIUM 4.0* 1.5* 2.0   TSH 1.820  --   --          Lab 03/17/23  1353   TOTAL PROTEIN 6.7   ALBUMIN 3.6   GLOBULIN 3.1   ALT (SGPT) 6   AST (SGOT) 11   BILIRUBIN 0.4   ALK PHOS 91         Lab 03/18/23  0019 03/17/23  2228 03/17/23  1353   HSTROP T 25* 21* 20*                 Brief Urine Lab Results  (Last result in the past 365 days)      Color   Clarity   Blood   Leuk Est   Nitrite   Protein   CREAT   Urine HCG        03/17/23 1349 Yellow   Clear   Negative   Negative   Negative   Trace               Microbiology Results (last 10 days)     ** No results found for the last 240 hours. **          CT Head Without Contrast    Result Date: 3/17/2023  CT HEAD " WO CONTRAST Date of Exam: 3/17/2023 4:21 PM EDT Indication: ams. Comparison: None available. Technique: Axial CT images were obtained of the head without contrast administration.  Reconstructed coronal and sagittal images were also obtained. Automated exposure control and iterative construction methods were used. FINDINGS: Brain/Ventricles: Mildly motion Limited imaging demonstrates no acute intracranial hemorrhage or mass effect. Mild diffuse atrophy is noted. White matter changes compatible small vessel ischemic disease in this age group noted. Orbits: Dilation of the orbits is nondiagnostic due to motion. Sinuses: Paranasal sinuses are markedly limited by motion. Mastoid air cells are grossly clear. Soft Tissues/Skull: No gross acute abnormality noted given limitations from motion     Motion Limited exam without definite acute intracranial abnormality Atrophy and white matter changes compatible small vessel ischemic disease in this age group. Additional follow-up can be obtained as clinically indicated  Electronically Signed: Nehemias Candelario  3/17/2023 4:35 PM EDT  Workstation ID: [id number]    CT Chest Without Contrast Diagnostic    Result Date: 3/17/2023  CT CHEST WO CONTRAST DIAGNOSTIC Date of Exam: 3/17/2023 4:21 PM EDT Indication: Altered mental status, hx pna. Comparison: AP chest x-ray 3/17/2023, 2 view chest x-ray 9/2/2022, CT chest 7/30/2022. Technique: Axial CT images were obtained of the chest without contrast administration.  Reconstructed coronal and sagittal images were also obtained. Automated exposure control and iterative construction methods were used. Findings: Exam is mildly limited by motion artifact. Previously seen left lower lobe irregular nodule has increased in size and now measures 3.2 x 2.7 cm on axial image 51, previously 1.8 x 1.3 cm. Irregular left upper lobe mass measures 4.2 x 3.4 cm on axial image 43, previously 5 mm. Mild-to-moderate paraseptal emphysematous changes are upper lobe  predominant. There is no pleural or pericardial effusion. Heart size is not enlarged. There are coronary artery calcifications. Enlarged mediastinal lymph nodes have increased in size since previous CT. Index subcarinal lymph node measures 2.4 x 2.1 cm on axial image 45, previously 1.8 x 1.1 cm. Index AP window lymph node measures 1.9 x 1.8 seen on axial image 37, previously 1.1 x 0.8 cm. Stable 1 cm left adrenal nodule is likely a lipid rich adenoma given its Hounsfield unit measurement of 0. T10 and T12 vertebral body fractures with severe height loss and retropulsion into the spinal canal appear stable.     Impression: 1.Increased size of 4.2 cm left upper lobe mass and 3.2 cm left lower lobe mass, highly concerning for primary lung malignancy. Recommend tissue sampling. 2.Increased size of mediastinal lymphadenopathy, likely due to metastatic disease. 3.Stable T10 and T12 vertebral body fractures with severe height loss and retropulsion into the spinal canal. Please see above for additional stable findings. Electronically Signed: Paula Carreon  3/17/2023 4:49 PM EDT  Workstation ID: [id number]    CT Abdomen Pelvis With Contrast    Result Date: 3/18/2023  CT ABDOMEN PELVIS W CONTRAST Date of Exam: 3/18/2023 3:26 PM EDT Indication: lung mass, ? mets. Comparison: CT chest from March 17, 2023 and lumbar spine radiographs from January 19, 2016 Technique: Axial CT images were obtained of the abdomen and pelvis following the uneventful intravenous administration of 85 mL Isovue-300. Reconstructed coronal and sagittal images were also obtained. Automated exposure control and iterative construction methods were used. Findings: Within the lung bases is bibasilar atelectasis and trace bilateral pleural effusions. There is focal hepatic steatosis near the falciform ligament. The gallbladder, adrenal glands, kidneys, spleen, and pancreas are unremarkable. The stomach appears normal. The small bowel appears normal in caliber  and configuration. The colon appears normal. The appendix appears normal. There is no ascites or loculated collection. No abnormally enlarged lymph nodes are identified. There is partially calcified plaque in the abdominal aorta. The pelvis is partially excluded by streak artifact from a right hip arthroplasty. Within this constraint, the rectum, uterus, and urinary bladder are grossly unremarkable. No aggressive osseous lesions are identified. There is a stable severe compression deformity at T12 with bony retropulsion. There is a mild superior endplate compression deformity at L2 with an associated fracture line and sclerosis, new from prior radiographs from 2016. There is a mild chronic appearing inferior endplate compression deformity at L4.     Impression: 1.No evidence of malignancy or metastasis within abdomen/pelvis. 2.Bibasilar atelectasis with trace bilateral pleural effusions. 3.Mild superior endplate compression deformity at L2 with associated fracture line and sclerosis, new from prior radiographs from 2016. This could be an acute or subacute fracture. Correlate with point tenderness and history of recent trauma. Electronically Signed: Rajiv Hughes  3/18/2023 6:54 PM EDT  Workstation ID: [id number]    XR Chest 1 View    Result Date: 3/17/2023  XR CHEST 1 VW Date of Exam: 3/17/2023 1:55 PM EDT Indication: Weak/Dizzy/AMS triage protocol. Comparison: AP chest x-ray 9/2/2022, CT chest 7/30/2022 Findings: Left lower lobe nodule is better visualized on chest CT. There is a new linear opacity in the left lower lung. Lung appears clear. No pneumothorax or large pleural effusion is seen. Heart size is not enlarged. There are arthritic changes of both shoulders.     Impression: 1.New linear left lower lung opacity, likely due to subsegmental atelectasis. 2.Left lower lobe nodule is better visualized on comparison chest CT. Electronically Signed: Paula Carreon  3/17/2023 2:27 PM EDT  Workstation ID:  [id number]                  Plan for Follow-up of Pending Labs/Results:     Discharge Details        Discharge Medications      New Medications      Instructions Start Date   PHENobarbital 32.4 MG tablet  Commonly known as: LUMINAL   32.4 mg, Oral, Nightly         Changes to Medications      Instructions Start Date   QUEtiapine 25 MG tablet  Commonly known as: SEROquel  What changed: Another medication with the same name was removed. Continue taking this medication, and follow the directions you see here.   quetiapine 25 mg tablet   TAKE ONE (1) TABLET BY MOUTH TWICE DAILY         Continue These Medications      Instructions Start Date   acetaminophen 500 MG tablet  Commonly known as: TYLENOL   1,000 mg, Oral, Every 4 Hours PRN      albuterol (2.5 MG/3ML) 0.083% nebulizer solution  Commonly known as: PROVENTIL   albuterol sulfate 2.5 mg/3 mL (0.083 %) solution for nebulization   Inhale 3 mL 3 times a day by nebulization route for 30 days.      albuterol sulfate  (90 Base) MCG/ACT inhaler  Commonly known as: PROVENTIL HFA;VENTOLIN HFA;PROAIR HFA   2 puffs, Inhalation, Every 4 Hours PRN      Anoro Ellipta 62.5-25 MCG/ACT aerosol powder  inhaler  Generic drug: Umeclidinium-Vilanterol   Anoro Ellipta 62.5 mcg-25 mcg/actuation powder for inhalation      atenolol 25 MG tablet  Commonly known as: TENORMIN   25 mg, Oral, 2 Times Daily      Calcium Carbonate-Vitamin D3 600-400 MG-UNIT tablet   calcium + vitamin d3 600-10   TAKE ONE TABLET BY MOUTH EVERY DAY      cholecalciferol 25 MCG (1000 UT) tablet  Commonly known as: VITAMIN D3   1,000 Units, Oral, Daily      diazePAM 5 MG tablet  Commonly known as: VALIUM   5 mg, Oral, 2 Times Daily PRN      escitalopram 20 MG tablet  Commonly known as: LEXAPRO   escitalopram 20 mg tablet   TAKE ONE TABLET BY MOUTH ONCE DAILY      furosemide 20 MG tablet  Commonly known as: Lasix   20 mg, Oral, Daily      hydrOXYzine 25 MG tablet  Commonly known as: ATARAX   50 mg, Oral, Every 6  Hours PRN      meclizine 25 MG tablet  Commonly known as: ANTIVERT   meclizine 25 mg tablet   Take 1 tablet every day by oral route as needed for 30 days.      pantoprazole 40 MG EC tablet  Commonly known as: PROTONIX   40 mg, Oral, Daily      potassium chloride 10 MEQ CR tablet   10 mEq, Oral, Daily      vitamin B-12 1000 MCG tablet  Commonly known as: CYANOCOBALAMIN   1,000 mcg, Oral, Daily             No Known Allergies      Discharge Disposition:  Hospice/Home    Diet:  Hospital:  Diet Order   Procedures   • Diet: Cardiac Diets; Healthy Heart (2-3 Na+); Texture: Regular Texture (IDDSI 7); Fluid Consistency: Thin (IDDSI 0)       Activity:  Activity Instructions     Activity as Tolerated            Restrictions or Other Recommendations:  None       CODE STATUS:    Code Status and Medical Interventions:   Ordered at: 03/20/23 0928     Medical Intervention Limits:    NO intubation (DNI)    NO antiarrhythmic drugs    NO cardioversion    NO dialysis     Level Of Support Discussed With:    Patient     Code Status (Patient has no pulse and is not breathing):    No CPR (Do Not Attempt to Resuscitate)     Medical Interventions (Patient has pulse or is breathing):    Limited Support     Release to patient:    Routine Release       Future Appointments   Date Time Provider Department Center   5/23/2023  3:00 PM Clayton Velazquez DO [facility]       Additional Instructions for the Follow-ups that You Need to Schedule     Discharge Follow-up with PCP   As directed       Currently Documented PCP:    Jesse Reeves DO    PCP Phone Number:    [phone]     Follow Up Details: Hospice will get involved after discharge               GUNJAN [place]  03/20/23      Time Spent on Discharge:  I spent 45  minutes on this discharge activity which included: face-to-face encounter with the patient, reviewing the data in the system, coordination of the care with the nursing staff as well as consultants, documentation, and  entering orders.

## 2023-03-31 ENCOUNTER — OFFICE VISIT (OUTPATIENT)
Dept: FAMILY MEDICINE CLINIC | Facility: CLINIC | Age: 68
End: 2023-03-31
Payer: MEDICARE

## 2023-03-31 VITALS
OXYGEN SATURATION: 98 % | HEART RATE: 89 BPM | BODY MASS INDEX: 19.47 KG/M2 | WEIGHT: 103.13 LBS | DIASTOLIC BLOOD PRESSURE: 70 MMHG | SYSTOLIC BLOOD PRESSURE: 130 MMHG | HEIGHT: 61 IN

## 2023-03-31 DIAGNOSIS — R91.8 LUNG MASS: ICD-10-CM

## 2023-03-31 DIAGNOSIS — I10 PRIMARY HYPERTENSION: ICD-10-CM

## 2023-03-31 DIAGNOSIS — M51.34 DDD (DEGENERATIVE DISC DISEASE), THORACIC: ICD-10-CM

## 2023-03-31 DIAGNOSIS — S22.000A COMPRESSION FRACTURE OF BODY OF THORACIC VERTEBRA: ICD-10-CM

## 2023-03-31 DIAGNOSIS — R42 DIZZINESS: ICD-10-CM

## 2023-03-31 DIAGNOSIS — K21.9 GASTROESOPHAGEAL REFLUX DISEASE WITHOUT ESOPHAGITIS: ICD-10-CM

## 2023-03-31 DIAGNOSIS — F31.62 BIPOLAR DISORDER, CURRENT EPISODE MIXED, MODERATE: Primary | ICD-10-CM

## 2023-03-31 DIAGNOSIS — J44.9 CHRONIC OBSTRUCTIVE PULMONARY DISEASE, UNSPECIFIED COPD TYPE: ICD-10-CM

## 2023-03-31 RX ORDER — QUETIAPINE FUMARATE 25 MG/1
25 TABLET, FILM COATED ORAL DAILY
Qty: 90 TABLET | Refills: 1 | Status: SHIPPED | OUTPATIENT
Start: 2023-03-31

## 2023-03-31 RX ORDER — FUROSEMIDE 20 MG/1
20 TABLET ORAL DAILY PRN
Qty: 30 TABLET | Refills: 1 | Status: SHIPPED | OUTPATIENT
Start: 2023-03-31

## 2023-03-31 RX ORDER — ESCITALOPRAM OXALATE 20 MG/1
20 TABLET ORAL EVERY MORNING
Qty: 90 TABLET | Refills: 1 | Status: SHIPPED | OUTPATIENT
Start: 2023-03-31

## 2023-03-31 RX ORDER — HYDROXYZINE HYDROCHLORIDE 25 MG/1
50 TABLET, FILM COATED ORAL EVERY 8 HOURS PRN
Qty: 45 TABLET | Refills: 3 | Status: SHIPPED | OUTPATIENT
Start: 2023-03-31

## 2023-03-31 RX ORDER — DIAZEPAM 5 MG/1
5 TABLET ORAL 2 TIMES DAILY PRN
Qty: 60 TABLET | Refills: 1 | Status: SHIPPED | OUTPATIENT
Start: 2023-03-31

## 2023-03-31 RX ORDER — MECLIZINE HYDROCHLORIDE 25 MG/1
25 TABLET ORAL 3 TIMES DAILY PRN
Qty: 90 TABLET | Refills: 1 | Status: SHIPPED | OUTPATIENT
Start: 2023-03-31

## 2023-03-31 RX ORDER — PANTOPRAZOLE SODIUM 40 MG/1
40 TABLET, DELAYED RELEASE ORAL DAILY
Qty: 90 TABLET | Refills: 1 | Status: SHIPPED | OUTPATIENT
Start: 2023-03-31

## 2023-03-31 RX ORDER — ATENOLOL 25 MG/1
25 TABLET ORAL 2 TIMES DAILY
Qty: 180 TABLET | Refills: 1 | Status: SHIPPED | OUTPATIENT
Start: 2023-03-31

## 2023-03-31 NOTE — PROGRESS NOTES
Transitional Care Follow Up Visit  Subjective     Emily Grant is a 68 y.o. female who presents for a transitional care management visit.    Within 48 business hours after discharge our office contacted her via telephone to coordinate her care and needs.      I reviewed and discussed the details of that call along with the discharge summary, hospital problems, inpatient lab results, inpatient diagnostic studies, and consultation reports with Emily.     Current outpatient and discharge medications have been reconciled for the patient.  Reviewed by: GUNJAN Johnson      No flowsheet data found.  Risk for Readmission (LACE) Score: 6 (3/20/2023  6:00 AM)      History of Present Illness   Here as a new patient  HTN on atenolol and lasix prn swelling  GERD on PPI,. Controlled  Bipolar, doing well but hx of severe relapses   She is not sure about pursing cancer treatment, reports breathing is stable      Course During Hospital Stay:   Left upper and lower lobe lung masses, suspicious for malignancy  COPD  Former tobacco abuse  -Request palliative care consult 3/18/2023  -Patient unsure if she would pursue treatment, also discussed with son     Failure to thrive in adult  Altered mental status/confusion  Global weakness  Ataxia  -PT and OT consulted and OT has signed off stating that patient is fine with her ADLs, but then when he asked her to lift her arms and things she acted like she could not do it.  PT recommends home with walker and HH versus skilled IP if she wants to mobilize independently  -Will likely need placement  -Case management consulted  -Fall precautions     Bipolar disorder  - Requested telemedicine psych visit-pending  - Continue Valium and Seroquel  -Continue Lexapro     Hypokalemia  -Replace per protocol     History of hypertension  History of CAD  Elevated high-sensitivity troponin  -Continue atenolol  -EKG with no acute ST changes  -Clinically no chest pain or shortness of breath at  rest     Degenerative disc disease  -Stable T10 and T12 vertebral body fractures with height loss   with past medical history of bipolar disorder, anxiety, degenerative disc disease, COPD, hypertension, lung mass, tobacco abuse, and CAD who presented to the hospital with failure to thrive, altered mental status/confusion, global weakness and inability to care for herself.  She was found to have new left upper and lower lobe masses suspicious for primary malignancy.     Left upper and lower lobe lung masses, suspicious for malignancy  COPD  Former tobacco abuse  -Request palliative care consult 3/18/2023  -Patient unsure if she would pursue treatment, also discussed with son     Failure to thrive in adult  Altered mental status/confusion  Global weakness  Ataxia  -PT and OT consulted and OT has signed off stating that patient is fine with her ADLs, but then when he asked her to lift her arms and things she acted like she could not do it.  PT recommends home with walker and HH versus skilled IP if she wants to mobilize independently  -Will likely need placement  -Case management consulted  -Fall precautions     Bipolar disorder  - Requested telemedicine psych visit-pending  - Continue Valium and Seroquel  -Continue Lexapro  --Luminal started by palliative care  --Patient and son decided that she would go home with Hospice, with no follow-up of her lung tumors     Hypokalemia  -Replace per protocol     History of hypertension  History of CAD  Elevated high-sensitivity troponin  -Continue atenolol  -EKG with no acute ST changes  -Clinically no chest pain or shortness of breath at rest     Degenerative disc disease  -Stable T10 and T12 vertebral body fractures with height loss     The following portions of the patient's history were reviewed and updated as appropriate: allergies, current medications, past family history, past medical history, past social history, past surgical history and problem list.    Review of  Systems  Gen- No fevers, chills  CV- No chest pain, palpitations  Resp- No cough, dyspnea  GI- No N/V/D, abd pain  Neuro-No dizziness, headaches    Objective   Physical Exam  Vitals and nursing note reviewed.   Constitutional:       Appearance: She is well-developed.   HENT:      Head: Normocephalic and atraumatic.   Eyes:      Pupils: Pupils are equal, round, and reactive to light.   Cardiovascular:      Rate and Rhythm: Normal rate and regular rhythm.      Heart sounds: Normal heart sounds.   Pulmonary:      Effort: Pulmonary effort is normal.      Breath sounds: Wheezing (KATHIE) present.   Abdominal:      General: Bowel sounds are normal. There is no distension.      Palpations: Abdomen is soft.      Tenderness: There is no abdominal tenderness.   Musculoskeletal:      Cervical back: Neck supple.   Skin:     General: Skin is warm and dry.   Neurological:      General: No focal deficit present.      Mental Status: She is alert and oriented to person, place, and time.   Psychiatric:         Mood and Affect: Mood normal.         Behavior: Behavior normal.         Assessment & Plan   Diagnoses and all orders for this visit:    1. Bipolar disorder, current episode mixed, moderate (HCC) (Primary)  -     Ambulatory Referral to Behavioral Health  -     diazePAM (VALIUM) 5 MG tablet; Take 1 tablet by mouth 2 (Two) Times a Day As Needed for Anxiety.  Dispense: 60 tablet; Refill: 1  -     escitalopram (LEXAPRO) 20 MG tablet; Take 1 tablet by mouth Every Morning.  Dispense: 90 tablet; Refill: 1  -     QUEtiapine (SEROquel) 25 MG tablet; Take 1 tablet by mouth Daily.  Dispense: 90 tablet; Refill: 1  -     hydrOXYzine (ATARAX) 25 MG tablet; Take 2 tablets by mouth Every 8 (Eight) Hours As Needed for Itching.  Dispense: 45 tablet; Refill: 3    2. Chronic obstructive pulmonary disease, unspecified COPD type ([facility])    3. Compression fracture of body of thoracic vertebra ([facility])    4. Primary hypertension  -     furosemide (Lasix) 20  MG tablet; Take 1 tablet by mouth Daily As Needed (swelling).  Dispense: 30 tablet; Refill: 1  -     atenolol (TENORMIN) 25 MG tablet; Take 1 tablet by mouth 2 (Two) Times a Day.  Dispense: 180 tablet; Refill: 1    5. DDD (degenerative disc disease), thoracic    6. Lung mass, suspected malignancy 3/17/2023    7. Dizziness  -     meclizine (ANTIVERT) 25 MG tablet; Take 1 tablet by mouth 3 (Three) Times a Day As Needed for Dizziness.  Dispense: 90 tablet; Refill: 1    8. Gastroesophageal reflux disease without esophagitis  -     pantoprazole (PROTONIX) 40 MG EC tablet; Take 1 tablet by mouth Daily.  Dispense: 90 tablet; Refill: 1      -- continue current meds  -- needs behavior health  -- discussed at length about potential cancer diagnosis, she will f/u with Dr. Mcmillan and think about things

## 2023-05-10 ENCOUNTER — OFFICE VISIT (OUTPATIENT)
Dept: FAMILY MEDICINE CLINIC | Facility: CLINIC | Age: 68
End: 2023-05-10
Payer: MEDICARE

## 2023-05-10 VITALS
HEIGHT: 60 IN | DIASTOLIC BLOOD PRESSURE: 98 MMHG | TEMPERATURE: 98.1 F | SYSTOLIC BLOOD PRESSURE: 130 MMHG | WEIGHT: 113.4 LBS | HEART RATE: 70 BPM | BODY MASS INDEX: 22.26 KG/M2 | OXYGEN SATURATION: 97 %

## 2023-05-10 DIAGNOSIS — I10 PRIMARY HYPERTENSION: ICD-10-CM

## 2023-05-10 DIAGNOSIS — F31.62 BIPOLAR DISORDER, CURRENT EPISODE MIXED, MODERATE: Primary | ICD-10-CM

## 2023-05-10 DIAGNOSIS — K21.9 GASTROESOPHAGEAL REFLUX DISEASE WITHOUT ESOPHAGITIS: ICD-10-CM

## 2023-05-10 DIAGNOSIS — I25.10 CORONARY ARTERY DISEASE INVOLVING NATIVE CORONARY ARTERY OF NATIVE HEART WITHOUT ANGINA PECTORIS: ICD-10-CM

## 2023-05-10 DIAGNOSIS — F41.9 ANXIETY DISORDER, UNSPECIFIED TYPE: ICD-10-CM

## 2023-05-10 DIAGNOSIS — R91.8 LUNG MASS: ICD-10-CM

## 2023-05-10 DIAGNOSIS — J44.9 CHRONIC OBSTRUCTIVE PULMONARY DISEASE, UNSPECIFIED COPD TYPE: ICD-10-CM

## 2023-05-10 PROCEDURE — 3080F DIAST BP >= 90 MM HG: CPT | Performed by: NURSE PRACTITIONER

## 2023-05-10 PROCEDURE — 1160F RVW MEDS BY RX/DR IN RCRD: CPT | Performed by: NURSE PRACTITIONER

## 2023-05-10 PROCEDURE — 99214 OFFICE O/P EST MOD 30 MIN: CPT | Performed by: NURSE PRACTITIONER

## 2023-05-10 PROCEDURE — 1159F MED LIST DOCD IN RCRD: CPT | Performed by: NURSE PRACTITIONER

## 2023-05-10 PROCEDURE — 3075F SYST BP GE 130 - 139MM HG: CPT | Performed by: NURSE PRACTITIONER

## 2023-05-10 RX ORDER — DIAZEPAM 5 MG/1
5 TABLET ORAL 2 TIMES DAILY PRN
Qty: 60 TABLET | Refills: 1 | Status: SHIPPED | OUTPATIENT
Start: 2023-05-10

## 2023-05-10 RX ORDER — ALBUTEROL SULFATE 90 UG/1
2 AEROSOL, METERED RESPIRATORY (INHALATION) EVERY 4 HOURS PRN
Qty: 6.7 G | Refills: 0 | Status: SHIPPED | OUTPATIENT
Start: 2023-05-10

## 2023-05-10 RX ORDER — QUETIAPINE FUMARATE 25 MG/1
25 TABLET, FILM COATED ORAL DAILY
Qty: 90 TABLET | Refills: 1 | Status: SHIPPED | OUTPATIENT
Start: 2023-05-10

## 2023-05-10 RX ORDER — PANTOPRAZOLE SODIUM 40 MG/1
40 TABLET, DELAYED RELEASE ORAL DAILY
Qty: 90 TABLET | Refills: 1 | Status: SHIPPED | OUTPATIENT
Start: 2023-05-10

## 2023-05-10 NOTE — PROGRESS NOTES
Subjective     Chief Complaint:    Chief Complaint   Patient presents with   • Hypertension     Here for FU from last appt       History of Present Illness:   Here for f/u  She has questionable lung cancer she is established with [facility] but has not called him for f/u and she is not sure if she is going to, breathing stable at this time, some chest congestion  Continue anoro for COPD  HTN on atenolol and lasix prn swelling  GERD on PPI,. Controlled  Bipolar, doing well but hx of severe relapses, lexapro, seroquel, valium. No relapse since last visit, she would liek to see behavior health but appt was never set up.   She is eating better, she has gained 10lbs since last visit  She is being complaint with her medication    Review of Systems  Gen- No fevers, chills  CV- No chest pain, palpitations  Resp- No cough, dyspnea  GI- No N/V/D, abd pain  Neuro-No dizziness, headaches      I have reviewed and/or updated the patient's past medical, surgical, family, social history and problem list as appropriate.     Medications:    Current Outpatient Medications:   •  acetaminophen (TYLENOL) 500 MG tablet, Take 2 tablets by mouth Every 4 (Four) Hours As Needed for Mild Pain., Disp: , Rfl:   •  albuterol (PROVENTIL) (2.5 MG/3ML) 0.083% nebulizer solution, albuterol sulfate 2.5 mg/3 mL (0.083 %) solution for nebulization  Inhale 3 mL 3 times a day by nebulization route for 30 days., Disp: , Rfl:   •  albuterol sulfate  (90 Base) MCG/ACT inhaler, Inhale 2 puffs Every 4 (Four) Hours As Needed for Wheezing., Disp: 6.7 g, Rfl: 0  •  atenolol (TENORMIN) 25 MG tablet, Take 1 tablet by mouth 2 (Two) Times a Day., Disp: 180 tablet, Rfl: 1  •  cholecalciferol (VITAMIN D3) 25 MCG (1000 UT) tablet, Take 1 tablet by mouth Daily., Disp: , Rfl:   •  diazePAM (VALIUM) 5 MG tablet, Take 1 tablet by mouth 2 (Two) Times a Day As Needed for Anxiety., Disp: 60 tablet, Rfl: 1  •  escitalopram (LEXAPRO) 20 MG tablet, Take 1 tablet by  "mouth Every Morning., Disp: 90 tablet, Rfl: 1  •  furosemide (Lasix) 20 MG tablet, Take 1 tablet by mouth Daily As Needed (swelling)., Disp: 30 tablet, Rfl: 1  •  hydrOXYzine (ATARAX) 25 MG tablet, Take 2 tablets by mouth Every 8 (Eight) Hours As Needed for Itching., Disp: 45 tablet, Rfl: 3  •  meclizine (ANTIVERT) 25 MG tablet, Take 1 tablet by mouth 3 (Three) Times a Day As Needed for Dizziness., Disp: 90 tablet, Rfl: 1  •  pantoprazole (PROTONIX) 40 MG EC tablet, Take 1 tablet by mouth Daily., Disp: 90 tablet, Rfl: 1  •  potassium chloride 10 MEQ CR tablet, Take 1 tablet by mouth Daily., Disp: 30 tablet, Rfl: 0  •  QUEtiapine (SEROquel) 25 MG tablet, Take 1 tablet by mouth Daily., Disp: 90 tablet, Rfl: 1  •  Umeclidinium-Vilanterol (Anoro Ellipta) 62.5-25 MCG/ACT aerosol powder  inhaler, Inhale 1 puff Daily., Disp: 1 each, Rfl: 3  •  vitamin B-12 (CYANOCOBALAMIN) 1000 MCG tablet, Take 1 tablet by mouth Daily., Disp: , Rfl:     Allergies:  No Known Allergies    Objective     Vital Signs:   Vitals:    05/10/23 1405   BP: 130/98   Pulse: 70   Temp: 98.1 °F (36.7 °C)   SpO2: 97%   Weight: 51.4 kg (113 lb 6.4 oz)   Height: 152.4 cm (60\")   PainSc: 0-No pain     Body mass index is 22.15 kg/m².    Physical Exam:    Physical Exam  Vitals and nursing note reviewed.   Constitutional:       Appearance: She is well-developed.   HENT:      Head: Normocephalic and atraumatic.   Eyes:      Pupils: Pupils are equal, round, and reactive to light.   Cardiovascular:      Rate and Rhythm: Normal rate and regular rhythm.      Heart sounds: Normal heart sounds.   Pulmonary:      Effort: Pulmonary effort is normal.      Breath sounds: Normal breath sounds.   Abdominal:      General: Bowel sounds are normal. There is no distension.      Palpations: Abdomen is soft.      Tenderness: There is no abdominal tenderness.   Musculoskeletal:      Cervical back: Neck supple.   Skin:     General: Skin is warm and dry.   Neurological:      " General: No focal deficit present.      Mental Status: She is alert and oriented to person, place, and time.   Psychiatric:         Mood and Affect: Mood normal.         Behavior: Behavior normal.         Assessment / Plan     Assessment/Plan:   Problem List Items Addressed This Visit        Cardiac and Vasculature    HTN (hypertension)    Relevant Medications    furosemide (Lasix) 20 MG tablet    atenolol (TENORMIN) 25 MG tablet    Coronary artery disease    Relevant Medications    atenolol (TENORMIN) 25 MG tablet       Mental Health    Anxiety disorder    Relevant Medications    escitalopram (LEXAPRO) 20 MG tablet    hydrOXYzine (ATARAX) 25 MG tablet    diazePAM (VALIUM) 5 MG tablet    QUEtiapine (SEROquel) 25 MG tablet    Bipolar disorder, current episode mixed, moderate - Primary    Relevant Medications    escitalopram (LEXAPRO) 20 MG tablet    hydrOXYzine (ATARAX) 25 MG tablet    diazePAM (VALIUM) 5 MG tablet    QUEtiapine (SEROquel) 25 MG tablet       Pulmonary and Pneumonias    COPD (chronic obstructive pulmonary disease)    Relevant Medications    albuterol (PROVENTIL) (2.5 MG/3ML) 0.083% nebulizer solution    albuterol sulfate  (90 Base) MCG/ACT inhaler    Umeclidinium-Vilanterol (Anoro Ellipta) 62.5-25 MCG/ACT aerosol powder  inhaler    Lung mass, suspected malignancy 3/17/2023    Relevant Medications    albuterol sulfate  (90 Base) MCG/ACT inhaler   Other Visit Diagnoses     Gastroesophageal reflux disease without esophagitis        Relevant Medications    pantoprazole (PROTONIX) 40 MG EC tablet        -- mood stable, continue current meds, will get her in with behavior heal  -- I have encouraged her to see Dr. Mcmillan, she will reach out on her own  -- BP stable  -- GERD stable  --- overall breathing seems stable      Discussed plan of care in detail with pt today; pt verb understanding and agrees.    Follow up:  3 months    Electronically signed by GUNJAN Johnson   05/10/2023  14:09 EDT      Please note that portions of this note were completed with a voice recognition program.

## 2023-06-05 DIAGNOSIS — I10 PRIMARY HYPERTENSION: ICD-10-CM

## 2023-06-05 DIAGNOSIS — R42 DIZZINESS: ICD-10-CM

## 2023-06-05 RX ORDER — FUROSEMIDE 20 MG/1
TABLET ORAL
Qty: 30 TABLET | Refills: 1 | Status: SHIPPED | OUTPATIENT
Start: 2023-06-05

## 2023-06-05 RX ORDER — MECLIZINE HYDROCHLORIDE 25 MG/1
TABLET ORAL
Qty: 90 TABLET | Refills: 1 | Status: SHIPPED | OUTPATIENT
Start: 2023-06-05